# Patient Record
Sex: FEMALE | Race: WHITE | NOT HISPANIC OR LATINO | URBAN - METROPOLITAN AREA
[De-identification: names, ages, dates, MRNs, and addresses within clinical notes are randomized per-mention and may not be internally consistent; named-entity substitution may affect disease eponyms.]

---

## 2017-06-05 ENCOUNTER — EMERGENCY (EMERGENCY)
Facility: HOSPITAL | Age: 67
LOS: 0 days | Discharge: HOME | End: 2017-06-05
Admitting: INTERNAL MEDICINE

## 2017-06-12 ENCOUNTER — APPOINTMENT (OUTPATIENT)
Dept: CARDIOLOGY | Facility: CLINIC | Age: 67
End: 2017-06-12

## 2017-06-19 ENCOUNTER — APPOINTMENT (OUTPATIENT)
Dept: CARDIOLOGY | Facility: CLINIC | Age: 67
End: 2017-06-19

## 2017-06-19 VITALS — HEART RATE: 84 BPM | SYSTOLIC BLOOD PRESSURE: 120 MMHG | RESPIRATION RATE: 18 BRPM | DIASTOLIC BLOOD PRESSURE: 80 MMHG

## 2017-06-19 VITALS — WEIGHT: 193 LBS | HEIGHT: 67 IN | BODY MASS INDEX: 30.29 KG/M2

## 2017-06-28 DIAGNOSIS — K21.9 GASTRO-ESOPHAGEAL REFLUX DISEASE WITHOUT ESOPHAGITIS: ICD-10-CM

## 2017-06-28 DIAGNOSIS — I25.2 OLD MYOCARDIAL INFARCTION: ICD-10-CM

## 2017-06-28 DIAGNOSIS — R11.10 VOMITING, UNSPECIFIED: ICD-10-CM

## 2017-06-28 DIAGNOSIS — Z79.899 OTHER LONG TERM (CURRENT) DRUG THERAPY: ICD-10-CM

## 2017-06-28 DIAGNOSIS — R10.13 EPIGASTRIC PAIN: ICD-10-CM

## 2018-03-06 ENCOUNTER — INPATIENT (INPATIENT)
Facility: HOSPITAL | Age: 68
LOS: 0 days | Discharge: HOME | End: 2018-03-07
Attending: INTERNAL MEDICINE | Admitting: INTERNAL MEDICINE

## 2018-03-06 ENCOUNTER — EMERGENCY (EMERGENCY)
Facility: HOSPITAL | Age: 68
LOS: 0 days | Discharge: HOME | End: 2018-03-06
Admitting: INTERNAL MEDICINE

## 2018-03-06 VITALS
OXYGEN SATURATION: 99 % | HEART RATE: 76 BPM | DIASTOLIC BLOOD PRESSURE: 76 MMHG | HEIGHT: 67.75 IN | SYSTOLIC BLOOD PRESSURE: 136 MMHG | WEIGHT: 190.92 LBS | RESPIRATION RATE: 20 BRPM | TEMPERATURE: 98 F

## 2018-03-06 DIAGNOSIS — Z98.890 OTHER SPECIFIED POSTPROCEDURAL STATES: Chronic | ICD-10-CM

## 2018-03-06 DIAGNOSIS — I25.10 ATHEROSCLEROTIC HEART DISEASE OF NATIVE CORONARY ARTERY WITHOUT ANGINA PECTORIS: ICD-10-CM

## 2018-03-06 DIAGNOSIS — R07.9 CHEST PAIN, UNSPECIFIED: ICD-10-CM

## 2018-03-06 LAB
ALBUMIN SERPL ELPH-MCNC: 4.4 G/DL — SIGNIFICANT CHANGE UP (ref 3–5.5)
ALP SERPL-CCNC: 54 U/L — SIGNIFICANT CHANGE UP (ref 30–115)
ALT FLD-CCNC: 26 U/L — SIGNIFICANT CHANGE UP (ref 0–41)
ANION GAP SERPL CALC-SCNC: 7 MMOL/L — SIGNIFICANT CHANGE UP (ref 7–14)
AST SERPL-CCNC: 35 U/L — SIGNIFICANT CHANGE UP (ref 0–41)
BASOPHILS # BLD AUTO: 0.05 K/UL — SIGNIFICANT CHANGE UP (ref 0–0.2)
BASOPHILS NFR BLD AUTO: 0.8 % — SIGNIFICANT CHANGE UP (ref 0–1)
BILIRUB SERPL-MCNC: 1.1 MG/DL — SIGNIFICANT CHANGE UP (ref 0.2–1.2)
BUN SERPL-MCNC: 18 MG/DL — SIGNIFICANT CHANGE UP (ref 10–20)
CALCIUM SERPL-MCNC: 9.7 MG/DL — SIGNIFICANT CHANGE UP (ref 8.5–10.1)
CHLORIDE SERPL-SCNC: 105 MMOL/L — SIGNIFICANT CHANGE UP (ref 98–110)
CK MB BLD-MCNC: 1 % — SIGNIFICANT CHANGE UP (ref 0–4)
CK MB BLD-MCNC: 2 % — SIGNIFICANT CHANGE UP (ref 0–4)
CK MB CFR SERPL CALC: 2.1 NG/ML — SIGNIFICANT CHANGE UP (ref 0.6–6.3)
CK MB CFR SERPL CALC: 3.4 NG/ML — SIGNIFICANT CHANGE UP (ref 0.6–6.3)
CK SERPL-CCNC: 148 U/L — SIGNIFICANT CHANGE UP (ref 0–225)
CK SERPL-CCNC: 212 U/L — SIGNIFICANT CHANGE UP (ref 0–225)
CO2 SERPL-SCNC: 25 MMOL/L — SIGNIFICANT CHANGE UP (ref 17–32)
CREAT SERPL-MCNC: 0.8 MG/DL — SIGNIFICANT CHANGE UP (ref 0.7–1.5)
EOSINOPHIL # BLD AUTO: 0.1 K/UL — SIGNIFICANT CHANGE UP (ref 0–0.7)
EOSINOPHIL NFR BLD AUTO: 1.6 % — SIGNIFICANT CHANGE UP (ref 0–8)
GLUCOSE SERPL-MCNC: 88 MG/DL — SIGNIFICANT CHANGE UP (ref 70–110)
HCT VFR BLD CALC: 40.5 % — SIGNIFICANT CHANGE UP (ref 37–47)
HGB BLD-MCNC: 13.8 G/DL — SIGNIFICANT CHANGE UP (ref 12–16)
IMM GRANULOCYTES NFR BLD AUTO: 0.2 % — SIGNIFICANT CHANGE UP (ref 0.1–0.3)
LIDOCAIN IGE QN: 25 U/L — SIGNIFICANT CHANGE UP (ref 7–60)
LYMPHOCYTES # BLD AUTO: 1.88 K/UL — SIGNIFICANT CHANGE UP (ref 1.2–3.4)
LYMPHOCYTES # BLD AUTO: 30.8 % — SIGNIFICANT CHANGE UP (ref 20.5–51.1)
MCHC RBC-ENTMCNC: 31 PG — SIGNIFICANT CHANGE UP (ref 27–31)
MCHC RBC-ENTMCNC: 34.1 G/DL — SIGNIFICANT CHANGE UP (ref 32–37)
MCV RBC AUTO: 91 FL — SIGNIFICANT CHANGE UP (ref 81–99)
MONOCYTES # BLD AUTO: 0.49 K/UL — SIGNIFICANT CHANGE UP (ref 0.1–0.6)
MONOCYTES NFR BLD AUTO: 8 % — SIGNIFICANT CHANGE UP (ref 1.7–9.3)
NEUTROPHILS # BLD AUTO: 3.57 K/UL — SIGNIFICANT CHANGE UP (ref 1.4–6.5)
NEUTROPHILS NFR BLD AUTO: 58.6 % — SIGNIFICANT CHANGE UP (ref 42.2–75.2)
NRBC # BLD: 0 /100 WBCS — SIGNIFICANT CHANGE UP (ref 0–0)
PLATELET # BLD AUTO: 196 K/UL — SIGNIFICANT CHANGE UP (ref 130–400)
POTASSIUM SERPL-MCNC: 4 MMOL/L — SIGNIFICANT CHANGE UP (ref 3.5–5)
POTASSIUM SERPL-SCNC: 4 MMOL/L — SIGNIFICANT CHANGE UP (ref 3.5–5)
PROT SERPL-MCNC: 6.8 G/DL — SIGNIFICANT CHANGE UP (ref 6–8)
RBC # BLD: 4.45 M/UL — SIGNIFICANT CHANGE UP (ref 4.2–5.4)
RBC # FLD: 12.6 % — SIGNIFICANT CHANGE UP (ref 11.5–14.5)
SODIUM SERPL-SCNC: 137 MMOL/L — SIGNIFICANT CHANGE UP (ref 135–146)
TROPONIN I SERPL-MCNC: <0.02 NG/ML — SIGNIFICANT CHANGE UP (ref 0–0.05)
TROPONIN I SERPL-MCNC: <0.02 NG/ML — SIGNIFICANT CHANGE UP (ref 0–0.05)
WBC # BLD: 6.1 K/UL — SIGNIFICANT CHANGE UP (ref 4.8–10.8)
WBC # FLD AUTO: 6.1 K/UL — SIGNIFICANT CHANGE UP (ref 4.8–10.8)

## 2018-03-06 RX ORDER — SODIUM CHLORIDE 9 MG/ML
3 INJECTION INTRAMUSCULAR; INTRAVENOUS; SUBCUTANEOUS ONCE
Qty: 0 | Refills: 0 | Status: COMPLETED | OUTPATIENT
Start: 2018-03-06 | End: 2018-03-06

## 2018-03-06 RX ORDER — ASPIRIN/CALCIUM CARB/MAGNESIUM 324 MG
81 TABLET ORAL DAILY
Qty: 0 | Refills: 0 | Status: DISCONTINUED | OUTPATIENT
Start: 2018-03-06 | End: 2018-03-07

## 2018-03-06 RX ORDER — ACETAMINOPHEN 500 MG
650 TABLET ORAL EVERY 6 HOURS
Qty: 0 | Refills: 0 | Status: DISCONTINUED | OUTPATIENT
Start: 2018-03-06 | End: 2018-03-07

## 2018-03-06 RX ORDER — ENOXAPARIN SODIUM 100 MG/ML
40 INJECTION SUBCUTANEOUS EVERY 24 HOURS
Qty: 0 | Refills: 0 | Status: DISCONTINUED | OUTPATIENT
Start: 2018-03-06 | End: 2018-03-07

## 2018-03-06 RX ADMIN — SODIUM CHLORIDE 3 MILLILITER(S): 9 INJECTION INTRAMUSCULAR; INTRAVENOUS; SUBCUTANEOUS at 11:43

## 2018-03-06 NOTE — ED PROVIDER NOTE - CARE PLAN
Principal Discharge DX:	Chest pain Principal Discharge DX:	Chest pain  Secondary Diagnosis:	Coronary artery disease

## 2018-03-06 NOTE — H&P ADULT - PSH
H/O lumpectomy    History of bowel resection    History of cholecystectomy    History of tonsillectomy

## 2018-03-06 NOTE — H&P ADULT - PROBLEM SELECTOR PLAN 1
-Admit to tele  -trend CE x 2 more sets (2200 & 0700)  -Asa 81mg PO QD  -check lipids in AM to initiate statin therapy  -Cardiology c/s

## 2018-03-06 NOTE — H&P ADULT - ASSESSMENT
67F with Hx of CAD presents to ED with complaints of chest heaviness, initial workup negative, subsequently admitted to telemetry to R/O ACS

## 2018-03-06 NOTE — ED PROVIDER NOTE - ATTENDING CONTRIBUTION TO CARE
Pt with a significant hx of cad and recent cp.  She was admitted for further evaluation secondary to high likelihood to worsen abruptly.

## 2018-03-06 NOTE — H&P ADULT - HISTORY OF PRESENT ILLNESS
67F presented to the ED with complaints of chest heaviness developing this morning while out walking.  patient states she was freightened by a dog and jumped, and immediately developed substernal chest "heaviness"  Patient went home to relax and heaviness persisted.  States she developed paresthesias in her b/l hands along with some mild diaphoresis and nausea without vomiting.  Patient came to the ED as she had Hx of CAd s/p MI in 2008 to be evaluated.  At interview patient resting comfortably.  States still with some chest heaviness however much improved from initial pain.

## 2018-03-06 NOTE — H&P ADULT - NSHPLABSRESULTS_GEN_ALL_CORE
13.8   6.10  )-----------( 196      ( 06 Mar 2018 11:09 )             40.5       03-06    137  |  105  |  18  ----------------------------<  88  4.0   |  25  |  0.8    Ca    9.7      06 Mar 2018 11:09    TPro  6.8  /  Alb  4.4  /  TBili  1.1  /  DBili  x   /  AST  35  /  ALT  26  /  AlkPhos  54  03-06              CARDIAC MARKERS ( 06 Mar 2018 11:09 )  <0.02 ng/mL / x     / 212 U/L / x     / 3.4 ng/mL

## 2018-03-06 NOTE — H&P ADULT - NSHPSOCIALHISTORY_GEN_ALL_CORE
Tobacco use: Denies   EtOH use: Denies   Illicit drug use: Denies   Marital Status: Patient is  and lives at home by herself

## 2018-03-06 NOTE — ED ADULT TRIAGE NOTE - CHIEF COMPLAINT QUOTE
"I was out walking and was frightened by a dog. Shortly thereafter I developed a sharp pain to the middle of my chest. My hands felt funny and my feet felt funny." Pt states sharp pain has resided but she has a heaviness to the chest. Pt has a history of MI in 2008.

## 2018-03-07 ENCOUNTER — TRANSCRIPTION ENCOUNTER (OUTPATIENT)
Age: 68
End: 2018-03-07

## 2018-03-07 VITALS
SYSTOLIC BLOOD PRESSURE: 145 MMHG | HEART RATE: 64 BPM | DIASTOLIC BLOOD PRESSURE: 67 MMHG | RESPIRATION RATE: 16 BRPM | TEMPERATURE: 97 F

## 2018-03-07 RX ORDER — ASPIRIN/CALCIUM CARB/MAGNESIUM 324 MG
1 TABLET ORAL
Qty: 0 | Refills: 0 | DISCHARGE
Start: 2018-03-07

## 2018-03-07 RX ADMIN — ENOXAPARIN SODIUM 40 MILLIGRAM(S): 100 INJECTION SUBCUTANEOUS at 05:24

## 2018-03-07 NOTE — DISCHARGE NOTE ADULT - PATIENT PORTAL LINK FT
You can access the [email or web address] Patient Portal, offered by [facility], by registering with the following website: [email or web address]

## 2018-03-07 NOTE — DISCHARGE NOTE ADULT - MEDICATION SUMMARY - MEDICATIONS TO TAKE
yes
I will START or STAY ON the medications listed below when I get home from the hospital:    aspirin 81 mg oral tablet, chewable  -- 1 tab(s) by mouth once a day  -- Indication: For Coronary artery disease

## 2018-03-07 NOTE — DISCHARGE NOTE ADULT - CARE PLAN
Principal Discharge DX:	Chest pain  Goal:	Resolution  Assessment and plan of treatment:	Continue with low dose ASA.  FU with PMD for evaluation to start statin.  FU outpatient with Cardiology

## 2018-03-07 NOTE — DISCHARGE NOTE ADULT - PLAN OF CARE
Resolution Continue with low dose ASA.  FU with PMD for evaluation to start statin.  FU outpatient with Cardiology

## 2018-03-12 PROBLEM — I21.9 ACUTE MYOCARDIAL INFARCTION, UNSPECIFIED: Chronic | Status: ACTIVE | Noted: 2018-03-06

## 2018-03-12 PROBLEM — C44.90 UNSPECIFIED MALIGNANT NEOPLASM OF SKIN, UNSPECIFIED: Chronic | Status: ACTIVE | Noted: 2018-03-06

## 2018-03-20 DIAGNOSIS — R20.2 PARESTHESIA OF SKIN: ICD-10-CM

## 2018-03-20 DIAGNOSIS — Z85.828 PERSONAL HISTORY OF OTHER MALIGNANT NEOPLASM OF SKIN: ICD-10-CM

## 2018-03-20 DIAGNOSIS — R07.89 OTHER CHEST PAIN: ICD-10-CM

## 2018-03-20 DIAGNOSIS — Z98.890 OTHER SPECIFIED POSTPROCEDURAL STATES: ICD-10-CM

## 2018-03-20 DIAGNOSIS — I25.10 ATHEROSCLEROTIC HEART DISEASE OF NATIVE CORONARY ARTERY WITHOUT ANGINA PECTORIS: ICD-10-CM

## 2018-03-20 DIAGNOSIS — Z90.49 ACQUIRED ABSENCE OF OTHER SPECIFIED PARTS OF DIGESTIVE TRACT: ICD-10-CM

## 2018-03-20 DIAGNOSIS — I25.2 OLD MYOCARDIAL INFARCTION: ICD-10-CM

## 2018-03-20 DIAGNOSIS — Z85.3 PERSONAL HISTORY OF MALIGNANT NEOPLASM OF BREAST: ICD-10-CM

## 2018-06-04 ENCOUNTER — APPOINTMENT (OUTPATIENT)
Dept: CARDIOLOGY | Facility: CLINIC | Age: 68
End: 2018-06-04

## 2018-06-11 ENCOUNTER — APPOINTMENT (OUTPATIENT)
Dept: CARDIOLOGY | Facility: CLINIC | Age: 68
End: 2018-06-11

## 2018-06-11 VITALS — WEIGHT: 190 LBS | HEIGHT: 67 IN | BODY MASS INDEX: 29.82 KG/M2

## 2018-06-11 VITALS — SYSTOLIC BLOOD PRESSURE: 120 MMHG | HEART RATE: 76 BPM | RESPIRATION RATE: 18 BRPM | DIASTOLIC BLOOD PRESSURE: 80 MMHG

## 2018-07-02 ENCOUNTER — APPOINTMENT (OUTPATIENT)
Dept: CARDIOLOGY | Facility: CLINIC | Age: 68
End: 2018-07-02

## 2019-05-06 ENCOUNTER — TRANSCRIPTION ENCOUNTER (OUTPATIENT)
Age: 69
End: 2019-05-06

## 2019-05-20 ENCOUNTER — TRANSCRIPTION ENCOUNTER (OUTPATIENT)
Age: 69
End: 2019-05-20

## 2019-05-21 ENCOUNTER — INPATIENT (INPATIENT)
Facility: HOSPITAL | Age: 69
LOS: 1 days | Discharge: HOME | End: 2019-05-23
Attending: INTERNAL MEDICINE | Admitting: INTERNAL MEDICINE
Payer: MEDICARE

## 2019-05-21 VITALS
TEMPERATURE: 96 F | RESPIRATION RATE: 18 BRPM | HEART RATE: 84 BPM | OXYGEN SATURATION: 97 % | DIASTOLIC BLOOD PRESSURE: 69 MMHG | SYSTOLIC BLOOD PRESSURE: 138 MMHG

## 2019-05-21 DIAGNOSIS — Z98.890 OTHER SPECIFIED POSTPROCEDURAL STATES: Chronic | ICD-10-CM

## 2019-05-21 PROBLEM — I25.10 ATHEROSCLEROTIC HEART DISEASE OF NATIVE CORONARY ARTERY WITHOUT ANGINA PECTORIS: Chronic | Status: ACTIVE | Noted: 2018-03-06

## 2019-05-21 LAB
ALBUMIN SERPL ELPH-MCNC: 4.7 G/DL — SIGNIFICANT CHANGE UP (ref 3.5–5.2)
ALP SERPL-CCNC: 97 U/L — SIGNIFICANT CHANGE UP (ref 30–115)
ALT FLD-CCNC: 22 U/L — SIGNIFICANT CHANGE UP (ref 0–41)
ANION GAP SERPL CALC-SCNC: 15 MMOL/L — HIGH (ref 7–14)
AST SERPL-CCNC: 29 U/L — SIGNIFICANT CHANGE UP (ref 0–41)
BASOPHILS # BLD AUTO: 0.03 K/UL — SIGNIFICANT CHANGE UP (ref 0–0.2)
BASOPHILS NFR BLD AUTO: 0.5 % — SIGNIFICANT CHANGE UP (ref 0–1)
BILIRUB SERPL-MCNC: 0.7 MG/DL — SIGNIFICANT CHANGE UP (ref 0.2–1.2)
BUN SERPL-MCNC: 19 MG/DL — SIGNIFICANT CHANGE UP (ref 10–20)
CALCIUM SERPL-MCNC: 10 MG/DL — SIGNIFICANT CHANGE UP (ref 8.5–10.1)
CHLORIDE SERPL-SCNC: 103 MMOL/L — SIGNIFICANT CHANGE UP (ref 98–110)
CO2 SERPL-SCNC: 21 MMOL/L — SIGNIFICANT CHANGE UP (ref 17–32)
CREAT SERPL-MCNC: 0.8 MG/DL — SIGNIFICANT CHANGE UP (ref 0.7–1.5)
EOSINOPHIL # BLD AUTO: 0.18 K/UL — SIGNIFICANT CHANGE UP (ref 0–0.7)
EOSINOPHIL NFR BLD AUTO: 2.8 % — SIGNIFICANT CHANGE UP (ref 0–8)
GLUCOSE SERPL-MCNC: 99 MG/DL — SIGNIFICANT CHANGE UP (ref 70–99)
HCT VFR BLD CALC: 44.6 % — SIGNIFICANT CHANGE UP (ref 37–47)
HGB BLD-MCNC: 15.2 G/DL — SIGNIFICANT CHANGE UP (ref 12–16)
IMM GRANULOCYTES NFR BLD AUTO: 0.3 % — SIGNIFICANT CHANGE UP (ref 0.1–0.3)
LACTATE SERPL-SCNC: 0.9 MMOL/L — SIGNIFICANT CHANGE UP (ref 0.5–2.2)
LIDOCAIN IGE QN: 38 U/L — SIGNIFICANT CHANGE UP (ref 7–60)
LYMPHOCYTES # BLD AUTO: 1.86 K/UL — SIGNIFICANT CHANGE UP (ref 1.2–3.4)
LYMPHOCYTES # BLD AUTO: 28.5 % — SIGNIFICANT CHANGE UP (ref 20.5–51.1)
MAGNESIUM SERPL-MCNC: 2 MG/DL — SIGNIFICANT CHANGE UP (ref 1.8–2.4)
MCHC RBC-ENTMCNC: 31.3 PG — HIGH (ref 27–31)
MCHC RBC-ENTMCNC: 34.1 G/DL — SIGNIFICANT CHANGE UP (ref 32–37)
MCV RBC AUTO: 91.8 FL — SIGNIFICANT CHANGE UP (ref 81–99)
MONOCYTES # BLD AUTO: 0.58 K/UL — SIGNIFICANT CHANGE UP (ref 0.1–0.6)
MONOCYTES NFR BLD AUTO: 8.9 % — SIGNIFICANT CHANGE UP (ref 1.7–9.3)
NEUTROPHILS # BLD AUTO: 3.85 K/UL — SIGNIFICANT CHANGE UP (ref 1.4–6.5)
NEUTROPHILS NFR BLD AUTO: 59 % — SIGNIFICANT CHANGE UP (ref 42.2–75.2)
NRBC # BLD: 0 /100 WBCS — SIGNIFICANT CHANGE UP (ref 0–0)
PLATELET # BLD AUTO: 207 K/UL — SIGNIFICANT CHANGE UP (ref 130–400)
POTASSIUM SERPL-MCNC: 4.5 MMOL/L — SIGNIFICANT CHANGE UP (ref 3.5–5)
POTASSIUM SERPL-SCNC: 4.5 MMOL/L — SIGNIFICANT CHANGE UP (ref 3.5–5)
PROT SERPL-MCNC: 7.3 G/DL — SIGNIFICANT CHANGE UP (ref 6–8)
RBC # BLD: 4.86 M/UL — SIGNIFICANT CHANGE UP (ref 4.2–5.4)
RBC # FLD: 12.6 % — SIGNIFICANT CHANGE UP (ref 11.5–14.5)
SODIUM SERPL-SCNC: 139 MMOL/L — SIGNIFICANT CHANGE UP (ref 135–146)
TROPONIN T SERPL-MCNC: 0.05 NG/ML — CRITICAL HIGH
WBC # BLD: 6.52 K/UL — SIGNIFICANT CHANGE UP (ref 4.8–10.8)
WBC # FLD AUTO: 6.52 K/UL — SIGNIFICANT CHANGE UP (ref 4.8–10.8)

## 2019-05-21 PROCEDURE — 93010 ELECTROCARDIOGRAM REPORT: CPT

## 2019-05-21 PROCEDURE — 99285 EMERGENCY DEPT VISIT HI MDM: CPT

## 2019-05-21 PROCEDURE — 71046 X-RAY EXAM CHEST 2 VIEWS: CPT | Mod: 26

## 2019-05-21 RX ORDER — PANTOPRAZOLE SODIUM 20 MG/1
40 TABLET, DELAYED RELEASE ORAL
Refills: 0 | Status: DISCONTINUED | OUTPATIENT
Start: 2019-05-21 | End: 2019-05-23

## 2019-05-21 RX ORDER — ASPIRIN/CALCIUM CARB/MAGNESIUM 324 MG
325 TABLET ORAL ONCE
Refills: 0 | Status: COMPLETED | OUTPATIENT
Start: 2019-05-21 | End: 2019-05-21

## 2019-05-21 RX ORDER — SUCRALFATE 1 G
1 TABLET ORAL EVERY 6 HOURS
Refills: 0 | Status: DISCONTINUED | OUTPATIENT
Start: 2019-05-21 | End: 2019-05-23

## 2019-05-21 RX ORDER — CHLORHEXIDINE GLUCONATE 213 G/1000ML
1 SOLUTION TOPICAL
Refills: 0 | Status: DISCONTINUED | OUTPATIENT
Start: 2019-05-21 | End: 2019-05-23

## 2019-05-21 RX ORDER — ASPIRIN/CALCIUM CARB/MAGNESIUM 324 MG
81 TABLET ORAL DAILY
Refills: 0 | Status: DISCONTINUED | OUTPATIENT
Start: 2019-05-22 | End: 2019-05-23

## 2019-05-21 RX ORDER — SIMVASTATIN 20 MG/1
10 TABLET, FILM COATED ORAL AT BEDTIME
Refills: 0 | Status: DISCONTINUED | OUTPATIENT
Start: 2019-05-21 | End: 2019-05-23

## 2019-05-21 RX ADMIN — Medication 325 MILLIGRAM(S): at 20:11

## 2019-05-21 NOTE — ED PROVIDER NOTE - CLINICAL SUMMARY MEDICAL DECISION MAKING FREE TEXT BOX
pt w CP, hx MI, new ischemic EKG changes and elevated troponin, ASA given, admit to tele, needs cardiology eval, stress testing.

## 2019-05-21 NOTE — H&P ADULT - NSHPPHYSICALEXAM_GEN_ALL_CORE
Vital Signs Last 24 Hrs  T(C): 36.4 (21 May 2019 19:16), Max: 36.4 (21 May 2019 19:16)  T(F): 97.5 (21 May 2019 19:16), Max: 97.5 (21 May 2019 19:16)  HR: 70 (21 May 2019 19:16) (70 - 84)  BP: 141/71 (21 May 2019 19:16) (138/69 - 141/71)  BP(mean): --  RR: 18 (21 May 2019 19:16) (18 - 18)  SpO2: 98% (21 May 2019 19:16) (97% - 98%)    Physical Exam:    -     General :     -      HEENT:    -      Cardiac:    -      Pulm:    -      GI:    -      Musculoskeletal:    -      Neuro: Vital Signs Last 24 Hrs  T(C): 36.4 (21 May 2019 19:16), Max: 36.4 (21 May 2019 19:16)  T(F): 97.5 (21 May 2019 19:16), Max: 97.5 (21 May 2019 19:16)  HR: 70 (21 May 2019 19:16) (70 - 84)  BP: 141/71 (21 May 2019 19:16) (138/69 - 141/71)  BP(mean): --  RR: 18 (21 May 2019 19:16) (18 - 18)  SpO2: 98% (21 May 2019 19:16) (97% - 98%)    Physical Exam:    -     General : alert, awake and in no acute distress    -      HEENT: normocephalic    -      Cardiac: RRR, normal S1/S2    -      Pulm: CTA B/L    -      GI: abdomen soft, non-tender    -      Musculoskeletal: no lower extremity edema    -      Neuro: AAO x3

## 2019-05-21 NOTE — ED PROVIDER NOTE - NS ED ROS FT
Eyes:  No visual changes, eye pain or discharge.  ENMT:  No hearing changes, pain, discharge or infections. No neck pain or stiffness.  Cardiac: Chest heaviness, LH. No leg swelling, palpitations. No cp with exertion.  Respiratory:  No cough or respiratory distress.   GI:  Epigastric pain. No nausea, vomiting, diarrhea  :  No dysuria, frequency or burning.  MS:  No myalgia, muscle weakness, joint pain or back pain.  Neuro:  No headache or weakness.  No LOC.  Skin:  No skin rash.   Endocrine: No history of thyroid disease or diabetes.

## 2019-05-21 NOTE — H&P ADULT - NSHPLABSRESULTS_GEN_ALL_CORE
Labs:                        15.2   6.52  )-----------( 207      ( 21 May 2019 17:30 )             44.6             05-21    139  |  103  |  19  ----------------------------<  99  4.5   |  21  |  0.8    Ca    10.0      21 May 2019 17:30  Mg     2.0     05-21    TPro  7.3  /  Alb  4.7  /  TBili  0.7  /  DBili  x   /  AST  29  /  ALT  22  /  AlkPhos  97  05-21    LIVER FUNCTIONS - ( 21 May 2019 17:30 )  Alb: 4.7 g/dL / Pro: 7.3 g/dL / ALK PHOS: 97 U/L / ALT: 22 U/L / AST: 29 U/L / GGT: x                   CARDIAC MARKERS ( 21 May 2019 17:30 )  x     / 0.05 ng/mL / x     / x     / x

## 2019-05-21 NOTE — H&P ADULT - ASSESSMENT
The patient is a 69-year-old female with a PMH of CAD, PUD, hiatal hernia, breast cancer s/p lumpectomy, rectal adenoma s/p resection, diverticulosis, and "skin cancer" who presented with a 2-day history of epigastric pain.    1. Epigastric pain  - admit to telemetry  - DDx includes PUD vs. unstable angina vs. musculoskeletal etiology  - [place] score is 3  - troponin: 0.05  - EKG: NSR with Q-waves in inferior leads, T-wave inversions in leads III, aVF, and V3-V6  - previous EKGs on record revealed similar Q-waves in inferior leads  - as per outpatient cardiology note from June 2018, patient previously had negative work-up for MI and was diagnosed with stress cardiomyopathy  - cardiology consult pending  - check repeat troponin and EKG  - start ASA and statin    2. History of PUD  - continue Carafate and PPI    3. DVT prophylaxis  - bilateral SCDs    4. Disposition  - anticipate discharge home once medically stable

## 2019-05-21 NOTE — ED PROVIDER NOTE - ATTENDING CONTRIBUTION TO CARE
69F PMH CAD s/p MI 2012 no stent, on no meds, s/p cholecystectomy, breast ca in remission, PUD, colonic polyp w partial colectomy, p/w CP. started over the weekend while walking outside, states she got sweaty, dizzy (lighthead) and chest heaviness assoc w SOB. resolved on its own w rest after few mins but ever since then has had random intermittent CP she describes as "leeroy horse in my lungs" that starts in epigastrium and wraps around both lungs, at rest or with minor exertion. pt is asymptomatic at this time.     no fever, cough. no trauma. no tearing bp. no le edema, le pain, immobilization, hormones, hemotpysis. cardiologist is Dr Smith, last stress 1 yr ago. nonsmoker.     on exam, AFVSS, well biju nad, ncat, eomi, perrla, mmm, lctab, rrr nl s1s2 no mrg, abd soft ntnd, aaox3, no focal deficits, no le edema or calf ttp,     a/p; CP, EKG w new TWI AVF, V4-6 as compared to last admission for r/o ACS. will send troponin, labs, CXR, ASA, admit for cardiology eval to tele high risk ACS concerning symptoms and EKG changes.

## 2019-05-21 NOTE — ED PROVIDER NOTE - PHYSICAL EXAMINATION
CONSTITUTIONAL: Well-developed; well-nourished; in no acute distress.   SKIN: warm, dry  HEAD: Normocephalic; atraumatic.  EYES: PERRL, EOMI, normal sclera and conjunctiva   ENT: No nasal discharge; airway clear.  NECK: Supple; non tender.  CARD: S1, S2 normal; no murmurs, gallops, or rubs. Regular rate and rhythm. Distal radial pulses intact and equal bilaterally. Pain not reproducible to palpation of chest wall.   RESP: No wheezes, rales or rhonchi.  ABD: soft ntnd, no cva tenderness.   EXT: Normal ROM.  No clubbing, cyanosis or edema.   LYMPH: No acute cervical adenopathy.  NEURO: Alert, oriented, grossly unremarkable. No cranial nerve deficits, moving all extremities well.  PSYCH: Cooperative, appropriate.

## 2019-05-21 NOTE — ED PROVIDER NOTE - CARE PLAN
Principal Discharge DX:	Chest pain  Secondary Diagnosis:	Elevated troponin  Secondary Diagnosis:	EKG abnormalities

## 2019-05-21 NOTE — H&P ADULT - ATTENDING COMMENTS
pt seen and examined independently, I have read and agree with above exam and poa,    chest pain  h/o cad  pud    cardio apprec  tele  nuclear stress  2decho  ppi

## 2019-05-21 NOTE — H&P ADULT - HISTORY OF PRESENT ILLNESS
The patient is a 69-year-old female with a PMH of CAD, PUD, breast cancer s/p lumpectomy, rectal adenoma s/p resection, diverticulosis, and "skin cancer" who presented with a 2-day history of epigastric pain.  She reports developing pain in her infra-mammary area described as a pressure-like sensation.  The pain was intermittent and lasts about 30 seconds at a time before resolving on its own.  It can occur at any time without relation to exertion or food intake.  She also experienced a sensation of breathlessness and back pain during these events.  She has never experienced similar symptoms in the past.  Otherwise, she reported experiencing stomach discomfort a day prior to onset of her current symptoms.  She denied having fever, chills, vomiting, or diarrhea. The patient is a 69-year-old female with a PMH of CAD, PUD, hiatal hernia, breast cancer s/p lumpectomy, rectal adenoma s/p resection, diverticulosis, and "skin cancer" who presented with a 2-day history of epigastric pain.  She reports developing pain in her infra-mammary area described as a pressure-like sensation.  The pain was intermittent and lasts about 30 seconds at a time before resolving on its own.  It can occur at any time without relation to exertion or food intake.  She also experienced a sensation of breathlessness and back pain during these events.  She has never experienced similar symptoms in the past.  Otherwise, she reported experiencing stomach discomfort a day prior to onset of her current symptoms.  She denied having fever, chills, vomiting, or diarrhea.

## 2019-05-21 NOTE — H&P ADULT - NSICDXPASTSURGICALHX_GEN_ALL_CORE_FT
PAST SURGICAL HISTORY:  H/O lumpectomy     History of bowel resection     History of cholecystectomy     History of tonsillectomy

## 2019-05-21 NOTE — ED PROVIDER NOTE - OBJECTIVE STATEMENT
69 y f pmh cad, peptic ulcer, s/p cholecystectomy pw chest pain. Midsternal chest heaviness with lightheadedness and dizziness for 2 days. Associated with epigastric pain. Intermittent, lasts a few minutes at time. No alleviating or exacerbating factors. No inciting incidents. Currently asymptomatic. Denies ha, n/v, back pain, arm pain, weakness, diarrhea, palpitations, syncope.

## 2019-05-22 ENCOUNTER — TRANSCRIPTION ENCOUNTER (OUTPATIENT)
Age: 69
End: 2019-05-22

## 2019-05-22 LAB
ALBUMIN SERPL ELPH-MCNC: 3.9 G/DL — SIGNIFICANT CHANGE UP (ref 3.5–5.2)
ALP SERPL-CCNC: 81 U/L — SIGNIFICANT CHANGE UP (ref 30–115)
ALT FLD-CCNC: 18 U/L — SIGNIFICANT CHANGE UP (ref 0–41)
ANION GAP SERPL CALC-SCNC: 12 MMOL/L — SIGNIFICANT CHANGE UP (ref 7–14)
AST SERPL-CCNC: 23 U/L — SIGNIFICANT CHANGE UP (ref 0–41)
BILIRUB SERPL-MCNC: 0.9 MG/DL — SIGNIFICANT CHANGE UP (ref 0.2–1.2)
BUN SERPL-MCNC: 15 MG/DL — SIGNIFICANT CHANGE UP (ref 10–20)
CALCIUM SERPL-MCNC: 9.6 MG/DL — SIGNIFICANT CHANGE UP (ref 8.5–10.1)
CHLORIDE SERPL-SCNC: 107 MMOL/L — SIGNIFICANT CHANGE UP (ref 98–110)
CHOLEST SERPL-MCNC: 191 MG/DL — SIGNIFICANT CHANGE UP (ref 100–200)
CK MB CFR SERPL CALC: 3.6 NG/ML — SIGNIFICANT CHANGE UP (ref 0.6–6.3)
CK SERPL-CCNC: 94 U/L — SIGNIFICANT CHANGE UP (ref 0–225)
CO2 SERPL-SCNC: 23 MMOL/L — SIGNIFICANT CHANGE UP (ref 17–32)
CREAT SERPL-MCNC: 0.8 MG/DL — SIGNIFICANT CHANGE UP (ref 0.7–1.5)
ESTIMATED AVERAGE GLUCOSE: 111 MG/DL — SIGNIFICANT CHANGE UP (ref 68–114)
GLUCOSE SERPL-MCNC: 77 MG/DL — SIGNIFICANT CHANGE UP (ref 70–99)
HBA1C BLD-MCNC: 5.5 % — SIGNIFICANT CHANGE UP (ref 4–5.6)
HCT VFR BLD CALC: 42.8 % — SIGNIFICANT CHANGE UP (ref 37–47)
HDLC SERPL-MCNC: 57 MG/DL — SIGNIFICANT CHANGE UP
HGB BLD-MCNC: 14.4 G/DL — SIGNIFICANT CHANGE UP (ref 12–16)
LIPID PNL WITH DIRECT LDL SERPL: 128 MG/DL — SIGNIFICANT CHANGE UP (ref 4–129)
MAGNESIUM SERPL-MCNC: 2 MG/DL — SIGNIFICANT CHANGE UP (ref 1.8–2.4)
MCHC RBC-ENTMCNC: 31.2 PG — HIGH (ref 27–31)
MCHC RBC-ENTMCNC: 33.6 G/DL — SIGNIFICANT CHANGE UP (ref 32–37)
MCV RBC AUTO: 92.8 FL — SIGNIFICANT CHANGE UP (ref 81–99)
NRBC # BLD: 0 /100 WBCS — SIGNIFICANT CHANGE UP (ref 0–0)
PLATELET # BLD AUTO: 161 K/UL — SIGNIFICANT CHANGE UP (ref 130–400)
POTASSIUM SERPL-MCNC: 4.4 MMOL/L — SIGNIFICANT CHANGE UP (ref 3.5–5)
POTASSIUM SERPL-SCNC: 4.4 MMOL/L — SIGNIFICANT CHANGE UP (ref 3.5–5)
PROT SERPL-MCNC: 6.2 G/DL — SIGNIFICANT CHANGE UP (ref 6–8)
RBC # BLD: 4.61 M/UL — SIGNIFICANT CHANGE UP (ref 4.2–5.4)
RBC # FLD: 12.5 % — SIGNIFICANT CHANGE UP (ref 11.5–14.5)
SODIUM SERPL-SCNC: 142 MMOL/L — SIGNIFICANT CHANGE UP (ref 135–146)
TOTAL CHOLESTEROL/HDL RATIO MEASUREMENT: 3.4 RATIO — LOW (ref 4–5.5)
TRIGL SERPL-MCNC: 63 MG/DL — SIGNIFICANT CHANGE UP (ref 10–149)
TROPONIN T SERPL-MCNC: 0.04 NG/ML — CRITICAL HIGH
TROPONIN T SERPL-MCNC: 0.04 NG/ML — CRITICAL HIGH
WBC # BLD: 5.1 K/UL — SIGNIFICANT CHANGE UP (ref 4.8–10.8)
WBC # FLD AUTO: 5.1 K/UL — SIGNIFICANT CHANGE UP (ref 4.8–10.8)

## 2019-05-22 PROCEDURE — 93306 TTE W/DOPPLER COMPLETE: CPT | Mod: 26

## 2019-05-22 PROCEDURE — 93016 CV STRESS TEST SUPVJ ONLY: CPT

## 2019-05-22 PROCEDURE — 78452 HT MUSCLE IMAGE SPECT MULT: CPT | Mod: 26

## 2019-05-22 PROCEDURE — 93018 CV STRESS TEST I&R ONLY: CPT

## 2019-05-22 PROCEDURE — 93010 ELECTROCARDIOGRAM REPORT: CPT | Mod: 59

## 2019-05-22 PROCEDURE — 99223 1ST HOSP IP/OBS HIGH 75: CPT

## 2019-05-22 RX ORDER — ASPIRIN/CALCIUM CARB/MAGNESIUM 324 MG
1 TABLET ORAL
Qty: 0 | Refills: 0 | DISCHARGE
Start: 2019-05-22

## 2019-05-22 RX ORDER — ADENOSINE 3 MG/ML
60 INJECTION INTRAVENOUS ONCE
Refills: 0 | Status: COMPLETED | OUTPATIENT
Start: 2019-05-22 | End: 2019-05-22

## 2019-05-22 RX ORDER — SIMVASTATIN 20 MG/1
1 TABLET, FILM COATED ORAL
Qty: 0 | Refills: 0 | DISCHARGE
Start: 2019-05-22

## 2019-05-22 RX ADMIN — SIMVASTATIN 10 MILLIGRAM(S): 20 TABLET, FILM COATED ORAL at 22:09

## 2019-05-22 RX ADMIN — Medication 81 MILLIGRAM(S): at 12:25

## 2019-05-22 RX ADMIN — Medication 1 GRAM(S): at 00:39

## 2019-05-22 RX ADMIN — Medication 1 GRAM(S): at 05:19

## 2019-05-22 RX ADMIN — Medication 1 GRAM(S): at 17:01

## 2019-05-22 RX ADMIN — ADENOSINE 600 MILLIGRAM(S): 3 INJECTION INTRAVENOUS at 14:10

## 2019-05-22 NOTE — PROGRESS NOTE ADULT - SUBJECTIVE AND OBJECTIVE BOX
GERARDO COCHRAN 69y Female  MRN#: [id number]         SUBJECTIVE  Patient is a 69y old Female who presents with a chief complaint of Epigastric pain (22 May 2019 00:30)  Currently admitted to medicine with the primary diagnosis of Chest pain    Today is hospital day 1d, and this morning pt is resting comfortably and reports no overnight events. She says her pain is currently resolved, and denies any sog, lightheadedness, or dizziness.         OBJECTIVE  PAST MEDICAL & SURGICAL HISTORY  Breast cancer  Coronary artery disease  Skin cancer  Heart attack  History of bowel resection  H/O lumpectomy  History of tonsillectomy  History of cholecystectomy    ALLERGIES:  No Known Allergies    MEDICATIONS:  STANDING MEDICATIONS  aspirin  chewable 81 milliGRAM(s) Oral daily  chlorhexidine 4% Liquid 1 Application(s) Topical <User Schedule>  pantoprazole    Tablet 40 milliGRAM(s) Oral before breakfast  simvastatin 10 milliGRAM(s) Oral at bedtime  sucralfate 1 Gram(s) Oral every 6 hours    PRN MEDICATIONS      Home Medications:  omeprazole 40 mg oral delayed release capsule: 1 cap(s) orally once a day (21 May 2019 21:41)  sucralfate 1 g oral tablet: 1 tab(s) orally 4 times a day (before meals and at bedtime) (21 May 2019 21:41)      VITAL SIGNS: Last 24 Hours  T(C): 36 (22 May 2019 05:54), Max: 36.4 (21 May 2019 19:16)  T(F): 96.8 (22 May 2019 05:54), Max: 97.5 (21 May 2019 19:16)  HR: 64 (22 May 2019 05:54) (64 - 84)  BP: 152/70 (22 May 2019 05:54) (138/69 - 152/70)  BP(mean): --  RR: 16 (22 May 2019 05:54) (16 - 18)  SpO2: 98% (21 May 2019 19:16) (97% - 98%)    LABS:                        14.4   5.10  )-----------( 161      ( 22 May 2019 04:54 )             42.8     05-22    142  |  107  |  15  ----------------------------<  77  4.4   |  23  |  0.8    Ca    9.6      22 May 2019 04:54  Mg     2.0     05-22    TPro  6.2  /  Alb  3.9  /  TBili  0.9  /  DBili  x   /  AST  23  /  ALT  18  /  AlkPhos  81  05-22          Creatine Kinase, Serum: 94 U/L (05-22-19 @ 04:54)  Troponin T, Serum: 0.04 ng/mL <HH> (05-22-19 @ 04:54)  Troponin T, Serum: 0.04 ng/mL <HH> (05-22-19 @ 01:00)  Troponin T, Serum: 0.05 ng/mL <HH> (05-21-19 @ 17:30)  Lactate, Blood: 0.9 mmol/L (05-21-19 @ 17:30)      CARDIAC MARKERS ( 22 May 2019 04:54 )  x     / 0.04 ng/mL / 94 U/L / x     / 3.6 ng/mL  CARDIAC MARKERS ( 22 May 2019 01:00 )  x     / 0.04 ng/mL / x     / x     / x      CARDIAC MARKERS ( 21 May 2019 17:30 )  x     / 0.05 ng/mL / x     / x     / x          I&O's Summary        PHYSICAL EXAM:    General: Not in distress.   HEENT: Moist mucus membranes. PERRLA.  Cardio: Regular rate and rhythm, S1, S2, no murmurs  Pulm: Clear to auscultation bilaterally. No wheezing, or rhonchi  Abdomen: Soft, non-tender, non-distended.   Extremities: No cyanosis or edema bilaterally.   Neuro: A&O x3. No focal deficits.       RADIOLOGY:    < from: Xray Chest 1 View AP/PA (03.06.18 @ 11:45) >  Impression:      No radiographic evidence of acutecardiopulmonary disease.    < end of copied text >

## 2019-05-22 NOTE — DISCHARGE NOTE PROVIDER - NSDCCPCAREPLAN_GEN_ALL_CORE_FT
PRINCIPAL DISCHARGE DIAGNOSIS  Diagnosis: Chest pain  Assessment and Plan of Treatment: Your EKG, cardiac enzymes, and stress test were all negative. Follow up with your cardiologist (Dr. Smith) within 2 weeks of discharge. PRINCIPAL DISCHARGE DIAGNOSIS  Diagnosis: Chest pain  Assessment and Plan of Treatment: Follow up with your cardiologist (Dr. Smith) within 2 weeks of discharge. PRINCIPAL DISCHARGE DIAGNOSIS  Diagnosis: Chest pain  Assessment and Plan of Treatment: LHC did not reveal any abnormalities.   Follow up with your cardiologist (Dr. Smith) within 2 weeks of discharge.

## 2019-05-22 NOTE — DISCHARGE NOTE PROVIDER - CARE PROVIDER_API CALL
Francis Smith)  Cardiovascular Disease; Internal Medicine  [address] [place] [address], [address]  [place] [address]  Phone: [phone]  Fax: [phone]  Follow Up Time:

## 2019-05-22 NOTE — CONSULT NOTE ADULT - SUBJECTIVE AND OBJECTIVE BOX
HISTORY OF PRESENT ILLNESS:     This  is a 69-year-old female with a PMH of stress related cardiomyopathy (takatsubu) around 7 years ago with normal cardiac cath at that time,  PUD, hiatal hernia, breast cancer s/p lumpectomy, rectal adenoma s/p resection, diverticulosis presented with a 2-day history of abdominal/chest pain.  She reports developing pain in her infra-mammary area,  described as a pressure-like sensation associated with crampy epigastric and upper abdominal pain, intermittent and lasts about 30 seconds at a time before resolving on its own. not related to food or exertion, last time was triggered after pulling some stuff from floor.     Seen at bedside, currently asymptomatic denies sob or palpitations, no syncope.   EKG showed normal sinus rhythm T wave inversion in III, avf and V4-V6, trop 0.05  hemodynamically stable     PAST MEDICAL & SURGICAL HISTORY:  Breast cancer  Skin cancer  Heart attack  History of bowel resection  H/O lumpectomy  History of tonsillectomy  History of cholecystectomy    FAMILY HISTORY:  No pertinent family history in first degree relatives    Allergies    No Known Allergies    Intolerances    	  Home Medications:  omeprazole 40 mg oral delayed release capsule: 1 cap(s) orally once a day (21 May 2019 21:41)  sucralfate 1 g oral tablet: 1 tab(s) orally 4 times a day (before meals and at bedtime) (21 May 2019 21:41)    MEDICATIONS  (STANDING):  aspirin  chewable 81 milliGRAM(s) Oral daily  chlorhexidine 4% Liquid 1 Application(s) Topical <User Schedule>  pantoprazole    Tablet 40 milliGRAM(s) Oral before breakfast  simvastatin 10 milliGRAM(s) Oral at bedtime  sucralfate 1 Gram(s) Oral every 6 hours    MEDICATIONS  (PRN):           REVIEW OF SYSTEMS:  CONSTITUTIONAL: No fever, weight loss, or fatigue  CARDIOLOGY: PAtient denies  shortness of breath or syncopal episodes.   RESPIRATORY: denies shortness of breath, wheezeing.   NEUROLOGICAL: NO weakness, no focal deficits to report.  GI: no BRBPR, no N,V,diarrhea.    PSYCHIATRY: normal mood and affect  HEENT: no nasal discharge, no ecchymosis  SKIN: no ecchymosis, no breakdown  MUSCULOSKELETAL: Full range of motion x4.      PHYSICAL EXAM:  T(C): 35.7 (05-21-19 @ 22:39), Max: 36.4 (05-21-19 @ 19:16)  HR: 75 (05-21-19 @ 22:39) (70 - 84)  BP: 140/77 (05-21-19 @ 22:39) (138/69 - 141/71)  RR: 16 (05-21-19 @ 22:39) (16 - 18)  SpO2: 98% (05-21-19 @ 19:16) (97% - 98%)  Wt(kg): --  I&O's Summary    Daily Height in cm: 170.18 (21 May 2019 22:39)    Daily     General Appearance: Normal	  Cardiovascular: Normal S1 S2, No JVD, No murmurs, No edema  Respiratory: Lungs clear to auscultation	  Psychiatry: A & O x 3, Mood & affect appropriate  Gastrointestinal:  Soft, Non-tender  Skin: No rashes, No ecchymoses, No cyanosis	  Neurologic: Non-focal  Extremities: Normal range of motion, No clubbing, cyanosis or edema  Vascular: Peripheral pulses palpable 2+ bilaterally        LABS:	 	                        15.2   6.52  )-----------( 207      ( 21 May 2019 17:30 )             44.6     05-21    139  |  103  |  19  ----------------------------<  99  4.5   |  21  |  0.8    Ca    10.0      21 May 2019 17:30  Mg     2.0     05-21    TPro  7.3  /  Alb  4.7  /  TBili  0.7  /  DBili  x   /  AST  29  /  ALT  22  /  AlkPhos  97  05-21          CARDIAC MARKERS:  Troponin T, Serum: 0.05 ng/mL (05-21 @ 17:30)            TELEMETRY EVENTS: 	sinus rhythm no major events     ECG:  < from: 12 Lead ECG (05.21.19 @ 17:33) >  Normal sinus rhythm  Right atrial enlargement  T wave abnormality, consider inferior ischemia  Abnormal ECG    < end of copied text >  	    	    PREVIOUS DIAGNOSTIC TESTING:    [ ] Echocardiogram:  normal EF at baseline HISTORY OF PRESENT ILLNESS:     This  is a 69-year-old female with a PMH of stress related cardiomyopathy (takatsubu) around 7 years ago with normal cardiac cath at that time,  PUD, hiatal hernia, breast cancer s/p lumpectomy, rectal adenoma s/p resection, diverticulosis presented with a 2-day history of abdominal/chest pain.  She reports developing pain in her infra-mammary area,  described as a pressure-like sensation associated with crampy epigastric and upper abdominal pain, intermittent and lasts about 30 seconds at a time before resolving on its own. not related to food or exertion, last time was triggered after pulling some stuff from floor.     Seen at bedside, currently asymptomatic denies sob or palpitations, no syncope.   EKG showed normal sinus rhythm T wave inversion in III, avf and V4-V6, trop 0.05  hemodynamically stable     PAST MEDICAL & SURGICAL HISTORY:  Breast cancer  Skin cancer  Heart attack  History of bowel resection  H/O lumpectomy  History of tonsillectomy  History of cholecystectomy    FAMILY HISTORY:  No pertinent family history in first degree relatives    SOCIAL HISTORY:  Unremarkable    Allergies    No Known Allergies    Intolerances    	  Home Medications:  omeprazole 40 mg oral delayed release capsule: 1 cap(s) orally once a day (21 May 2019 21:41)  sucralfate 1 g oral tablet: 1 tab(s) orally 4 times a day (before meals and at bedtime) (21 May 2019 21:41)    MEDICATIONS  (STANDING):  aspirin  chewable 81 milliGRAM(s) Oral daily  chlorhexidine 4% Liquid 1 Application(s) Topical <User Schedule>  pantoprazole    Tablet 40 milliGRAM(s) Oral before breakfast  simvastatin 10 milliGRAM(s) Oral at bedtime  sucralfate 1 Gram(s) Oral every 6 hours    MEDICATIONS  (PRN):           REVIEW OF SYSTEMS:  CONSTITUTIONAL: No fever, weight loss, or fatigue  CARDIOLOGY: PAtient denies  shortness of breath or syncopal episodes.   RESPIRATORY: denies shortness of breath, wheezeing.   NEUROLOGICAL: NO weakness, no focal deficits to report.  GI: no BRBPR, no N,V,diarrhea.    PSYCHIATRY: normal mood and affect  HEENT: no nasal discharge, no ecchymosis  SKIN: no ecchymosis, no breakdown  MUSCULOSKELETAL: Full range of motion x4.      PHYSICAL EXAM:  T(C): 35.7 (05-21-19 @ 22:39), Max: 36.4 (05-21-19 @ 19:16)  HR: 75 (05-21-19 @ 22:39) (70 - 84)  BP: 140/77 (05-21-19 @ 22:39) (138/69 - 141/71)  RR: 16 (05-21-19 @ 22:39) (16 - 18)  SpO2: 98% (05-21-19 @ 19:16) (97% - 98%)  Wt(kg): --  I&O's Summary    Daily Height in cm: 170.18 (21 May 2019 22:39)    Daily     General: W\D W\N AAO x 3 Appearance: Normal	  HEENMT: WNL  Neck: No JVD   Cardiovascular: Normal S1 S2, No JVD, No murmurs, No edema  Respiratory: Lungs clear to auscultation	  Psychiatry: A & O x 3, Mood & affect appropriate  Gastrointestinal:  Soft, Non-tender  Skin: No rashes, No ecchymoses, No cyanosis	  Neurologic: Non-focal  Extremities: Normal range of motion, No clubbing, cyanosis or edema  Vascular: Peripheral pulses palpable 2+ bilaterally        LABS:	 	                        15.2   6.52  )-----------( 207      ( 21 May 2019 17:30 )             44.6     05-21    139  |  103  |  19  ----------------------------<  99  4.5   |  21  |  0.8    Ca    10.0      21 May 2019 17:30  Mg     2.0     05-21    TPro  7.3  /  Alb  4.7  /  TBili  0.7  /  DBili  x   /  AST  29  /  ALT  22  /  AlkPhos  97  05-21          CARDIAC MARKERS:  Troponin T, Serum: 0.05 ng/mL (05-21 @ 17:30)            TELEMETRY EVENTS: 	sinus rhythm no major events     ECG:  < from: 12 Lead ECG (05.21.19 @ 17:33) >  Normal sinus rhythm  Right atrial enlargement  T wave abnormality, consider inferior ischemia  Abnormal ECG    < end of copied text >  	    	    PREVIOUS DIAGNOSTIC TESTING:    [ ] Echocardiogram:  normal EF at baseline

## 2019-05-22 NOTE — DISCHARGE NOTE PROVIDER - HOSPITAL COURSE
Pt presnted with epigastric pain, had negative trops, EKG unremarkable. Received an adenosine stress test that came back negative. SHe will eb discharged with plan to follow up with her cardiologist DR. Smith. Etiology of the pain is likely muscoskeletal. Pt presnted with epigastric pain, had negative trops, EKG unremarkable. Received an adenosine stress test that . SHe will eb discharged with plan to follow up with her cardiologist DR. Smith. Pt presnted with epigastric pain, had negative trops, EKG unremarkable. Received an adenosine stress test that .    Cardiac cath was done on 5/23 which showed     Left main normal short        LAD:  minor irregularities                         Diag: normal        Left Circumflex: minor irregularities     OM: normal         Right Coronary Artery: normal     RPDA patent         No intervention was done. Can be d/c'ed from cardiac stand point.         follow up with her cardiologist DR. Smith.

## 2019-05-22 NOTE — PROGRESS NOTE ADULT - ASSESSMENT
The patient is a 69-year-old female with a PMH of CAD, PUD, hiatal hernia, breast cancer s/p lumpectomy, rectal adenoma s/p resection, diverticulosis, and "skin cancer" who presented with a 2-day history of epigastric pain.    #Epigastric pain r/o ACS  - DDx includes PUD vs. unstable angina vs. musculoskeletal etiology  - troponin: 0.05, 0.04, 0.04  - EKG: NSR with Q-waves in inferior leads, T-wave inversions in leads III, aVF, and V3-V6  - previous EKGs on record revealed similar Q-waves in inferior leads  - Adenosine stress test to r/o ischemia      #History of PUD  - continue Carafate and  protonix 40mg PO    #DVT prophylaxis  - bilateral SCDs

## 2019-05-22 NOTE — CONSULT NOTE ADULT - ASSESSMENT
-Atypical chest pain, r/o ischemia vs musculoskeletal, hemodynamically stable, currently asymptomatic    Plan:  tele  asa  statin   2decho   trend CE  PPI  will consider ischemic workup prior to discharge according to further test results    *will continue to follow -Atypical chest pain, r/o ischemia vs musculoskeletal, hemodynamically stable, currently asymptomatic    Plan:  tele  asa  statin   2decho   trend CE  PPI  Nuclear Stress Test

## 2019-05-22 NOTE — PATIENT PROFILE ADULT - HARM RISK FACTORS
patient with complain of neck and lower back pain with nausea, vomiting and dizziness in the morning, denies trauma
no

## 2019-05-23 ENCOUNTER — TRANSCRIPTION ENCOUNTER (OUTPATIENT)
Age: 69
End: 2019-05-23

## 2019-05-23 VITALS — TEMPERATURE: 98 F | HEART RATE: 82 BPM | DIASTOLIC BLOOD PRESSURE: 82 MMHG | SYSTOLIC BLOOD PRESSURE: 159 MMHG

## 2019-05-23 LAB
ALBUMIN SERPL ELPH-MCNC: 4.4 G/DL — SIGNIFICANT CHANGE UP (ref 3.5–5.2)
ALP SERPL-CCNC: 87 U/L — SIGNIFICANT CHANGE UP (ref 30–115)
ALT FLD-CCNC: 18 U/L — SIGNIFICANT CHANGE UP (ref 0–41)
ANION GAP SERPL CALC-SCNC: 13 MMOL/L — SIGNIFICANT CHANGE UP (ref 7–14)
AST SERPL-CCNC: 21 U/L — SIGNIFICANT CHANGE UP (ref 0–41)
BILIRUB SERPL-MCNC: 0.9 MG/DL — SIGNIFICANT CHANGE UP (ref 0.2–1.2)
BUN SERPL-MCNC: 22 MG/DL — HIGH (ref 10–20)
CALCIUM SERPL-MCNC: 9.6 MG/DL — SIGNIFICANT CHANGE UP (ref 8.5–10.1)
CHLORIDE SERPL-SCNC: 106 MMOL/L — SIGNIFICANT CHANGE UP (ref 98–110)
CO2 SERPL-SCNC: 20 MMOL/L — SIGNIFICANT CHANGE UP (ref 17–32)
CREAT SERPL-MCNC: 0.8 MG/DL — SIGNIFICANT CHANGE UP (ref 0.7–1.5)
GLUCOSE SERPL-MCNC: 104 MG/DL — HIGH (ref 70–99)
HCT VFR BLD CALC: 43.9 % — SIGNIFICANT CHANGE UP (ref 37–47)
HCV AB S/CO SERPL IA: 0.19 S/CO — SIGNIFICANT CHANGE UP (ref 0–0.99)
HCV AB SERPL-IMP: SIGNIFICANT CHANGE UP
HGB BLD-MCNC: 15.1 G/DL — SIGNIFICANT CHANGE UP (ref 12–16)
MCHC RBC-ENTMCNC: 31.3 PG — HIGH (ref 27–31)
MCHC RBC-ENTMCNC: 34.4 G/DL — SIGNIFICANT CHANGE UP (ref 32–37)
MCV RBC AUTO: 91.1 FL — SIGNIFICANT CHANGE UP (ref 81–99)
NRBC # BLD: 0 /100 WBCS — SIGNIFICANT CHANGE UP (ref 0–0)
PLATELET # BLD AUTO: 187 K/UL — SIGNIFICANT CHANGE UP (ref 130–400)
POTASSIUM SERPL-MCNC: 4.3 MMOL/L — SIGNIFICANT CHANGE UP (ref 3.5–5)
POTASSIUM SERPL-SCNC: 4.3 MMOL/L — SIGNIFICANT CHANGE UP (ref 3.5–5)
PROT SERPL-MCNC: 6.8 G/DL — SIGNIFICANT CHANGE UP (ref 6–8)
RBC # BLD: 4.82 M/UL — SIGNIFICANT CHANGE UP (ref 4.2–5.4)
RBC # FLD: 12.3 % — SIGNIFICANT CHANGE UP (ref 11.5–14.5)
SODIUM SERPL-SCNC: 139 MMOL/L — SIGNIFICANT CHANGE UP (ref 135–146)
WBC # BLD: 5.44 K/UL — SIGNIFICANT CHANGE UP (ref 4.8–10.8)
WBC # FLD AUTO: 5.44 K/UL — SIGNIFICANT CHANGE UP (ref 4.8–10.8)

## 2019-05-23 PROCEDURE — 99232 SBSQ HOSP IP/OBS MODERATE 35: CPT

## 2019-05-23 PROCEDURE — 93458 L HRT ARTERY/VENTRICLE ANGIO: CPT | Mod: 26

## 2019-05-23 RX ORDER — SODIUM CHLORIDE 9 MG/ML
1000 INJECTION, SOLUTION INTRAVENOUS
Refills: 0 | Status: DISCONTINUED | OUTPATIENT
Start: 2019-05-23 | End: 2019-05-23

## 2019-05-23 RX ADMIN — SODIUM CHLORIDE 75 MILLILITER(S): 9 INJECTION, SOLUTION INTRAVENOUS at 10:45

## 2019-05-23 RX ADMIN — PANTOPRAZOLE SODIUM 40 MILLIGRAM(S): 20 TABLET, DELAYED RELEASE ORAL at 05:43

## 2019-05-23 RX ADMIN — Medication 1 GRAM(S): at 05:41

## 2019-05-23 RX ADMIN — Medication 81 MILLIGRAM(S): at 11:55

## 2019-05-23 RX ADMIN — Medication 1 GRAM(S): at 03:01

## 2019-05-23 NOTE — PROGRESS NOTE ADULT - ASSESSMENT
69-year-old female with a PMH of CAD, PUD, hiatal hernia, breast cancer s/p lumpectomy, rectal adenoma s/p resection, diverticulosis, and "skin cancer" who presented with a 2-day history of epigastric pain.    #Abnormal stress test  - Findings consistent with LV inferolateral apical wall ischemia on Adenosine stress test. T.I.D index 1.6  - Pt scheduled for cardiac catheterization today. Was kept NPO since midnight.  - troponin: 0.05, 0.04, 0.04  - EKG: NSR with Q-waves in inferior leads, T-wave inversions in leads III, aVF, and V3-V6      #History of PUD  - continue Carafate and  protonix 40mg PO    #DVT prophylaxis  - bilateral SCDs

## 2019-05-23 NOTE — PROGRESS NOTE ADULT - SUBJECTIVE AND OBJECTIVE BOX
GERARDO COCHRAN 69y Female  MRN#: [id number]         SUBJECTIVE  Patient is a 69y old Female who presents with a chief complaint of Epigastric pain (22 May 2019 14:42)  Currently admitted to medicine with the primary diagnosis of Chest pain    Today is hospital day 2d, and this morning pt is resting comfortably. Her stress test came back positive, and she reports being slightly concerned about the results. She agrees with the plan of going to Cath today. Denies any chest pain, palpitations, SOB, dizziness, or lightheadedness at this time.        OBJECTIVE  PAST MEDICAL & SURGICAL HISTORY  Breast cancer  Coronary artery disease  Skin cancer  Heart attack  History of bowel resection  H/O lumpectomy  History of tonsillectomy  History of cholecystectomy    ALLERGIES:  No Known Allergies    MEDICATIONS:  STANDING MEDICATIONS  aspirin  chewable 81 milliGRAM(s) Oral daily  chlorhexidine 4% Liquid 1 Application(s) Topical <User Schedule>  pantoprazole    Tablet 40 milliGRAM(s) Oral before breakfast  simvastatin 10 milliGRAM(s) Oral at bedtime  sucralfate 1 Gram(s) Oral every 6 hours    PRN MEDICATIONS      Home Medications:  aspirin 81 mg oral tablet, chewable: 1 tab(s) orally once a day (22 May 2019 14:43)  omeprazole 40 mg oral delayed release capsule: 1 cap(s) orally once a day (21 May 2019 21:41)  simvastatin 10 mg oral tablet: 1 tab(s) orally once a day (at bedtime) (22 May 2019 14:43)  sucralfate 1 g oral tablet: 1 tab(s) orally 4 times a day (before meals and at bedtime) (21 May 2019 21:41)      VITAL SIGNS: Last 24 Hours  T(C): 36.2 (23 May 2019 05:13), Max: 36.2 (22 May 2019 16:09)  T(F): 97.1 (23 May 2019 05:13), Max: 97.1 (22 May 2019 16:09)  HR: 64 (23 May 2019 05:13) (64 - 86)  BP: 120/65 (23 May 2019 05:13) (120/65 - 163/94)  BP(mean): --  RR: 18 (23 May 2019 05:13) (16 - 18)  SpO2: --    LABS:                        15.1   5.44  )-----------( 187      ( 23 May 2019 07:47 )             43.9     05-23    139  |  106  |  22<H>  ----------------------------<  104<H>  4.3   |  20  |  0.8    Ca    9.6      23 May 2019 07:47  Mg     2.0     05-22    TPro  6.8  /  Alb  4.4  /  TBili  0.9  /  DBili  x   /  AST  21  /  ALT  18  /  AlkPhos  87  05-23              CARDIAC MARKERS ( 22 May 2019 04:54 )  x     / 0.04 ng/mL / 94 U/L / x     / 3.6 ng/mL  CARDIAC MARKERS ( 22 May 2019 01:00 )  x     / 0.04 ng/mL / x     / x     / x      CARDIAC MARKERS ( 21 May 2019 17:30 )  x     / 0.05 ng/mL / x     / x     / x          I&O's Summary    22 May 2019 07:01  -  23 May 2019 07:00  --------------------------------------------------------  IN: 400 mL / OUT: 450 mL / NET: -50 mL          PHYSICAL EXAM:    General: Not in distress. Pleasant  HEENT: Moist mucus membranes. PERRLA.  Cardio: Regular rate and rhythm, S1, S2, no murmurs  Pulm: Clear to auscultation bilaterally. No wheezing, or rhonchi  Abdomen: Soft, non-tender, non-distended.   Extremities: No cyanosis or edema bilaterally. .  Neuro: A&O x3. No focal deficits.       RADIOLOGY:    < from: Xray Chest 1 View AP/PA (03.06.18 @ 11:45) >  Impression:      No radiographic evidence of acutecardiopulmonary disease.    < end of copied text >    < from: NM Nuclear Stress Pharmacologic Multiple (05.22.19 @ 16:01) >  Impression:   1.   Moderate size severe reversible defect in the inferolateral apical   wall of the left ventricle consistent with ischemia.  2.  Transient ischemic dilatation (t.i.d. index 1.61) which makes the   finding of ischemia more clinically significant.  3.  Normal left ventricular wall motion and wall thickening.  4.  Left ventricular ejection fraction calculated as >80% which is within   the range of normal or in the hyperdynamic range    < end of copied text >      < from: Transthoracic Echocardiogram (05.22.19 @ 14:35) >  Summary:   1. Left ventricular ejection fraction, by visual estimation, is 55 to   60%.   2. Spectral Doppler shows impaired relaxation pattern of left   ventricular myocardial filling (Grade I diastolic dysfunction).    < end of copied text >

## 2019-05-23 NOTE — CHART NOTE - NSCHARTNOTEFT_GEN_A_CORE
PRE-OP DIAGNOSIS: abnormal nuclear stress test     PROCEDURE: [facility]    Physician: Dr Jacinto  Assistant: Dr Francisco Sheth    ANESTHESIA TYPE:  [  ]General Anesthesia  [  ] Sedation  [ x ] Local/Regional    CONDITION  [  ] Critical  [  ] Serious  [  ]Fair  [ x ]Good      IV CONTRAST:    30      mL      FINDINGS    Left Heart Catheterization:  LVEDP: normal         LEFT HEART CATHERIZATION                                    Left main normal short    LAD:  minor irregularities                       Diag: normal    Left Circumflex: minor irregularities   OM: normal     Right Cornary Artery: normal   RPDA patent       INTERVENTION  none           PLAN OF CARE    [x ] Return to In-patient bed PRE-OP DIAGNOSIS: abnormal nuclear stress test     PROCEDURE: [facility]    Physician: Dr Jacinto  Assistant: Dr Francisco Sheth    ANESTHESIA TYPE:  [  ]General Anesthesia  [  ] Sedation  [ x ] Local/Regional    CONDITION  [  ] Critical  [  ] Serious  [  ]Fair  [ x ]Good      IV CONTRAST:    30      mL      FINDINGS    Left Heart Catheterization:  LVEDP: normal         LEFT HEART CATHETERIZATION                                    Left main normal short    LAD:  minor irregularities                       Diag: normal    Left Circumflex: minor irregularities   OM: normal     Right Coronary Artery: normal   RPDA patent       INTERVENTION  none           PLAN OF CARE    [x ] Return to In-patient bed

## 2019-05-23 NOTE — PROGRESS NOTE ADULT - SUBJECTIVE AND OBJECTIVE BOX
Patient was seen and examined. Spoke with RN. Chart reviewed.    No events overnight.  Vital Signs Last 24 Hrs  T(F): 96.7 (23 May 2019 13:46), Max: 97.1 (22 May 2019 16:09)  HR: 66 (23 May 2019 13:46) (64 - 86)  BP: 130/58 (23 May 2019 13:46) (120/65 - 163/94)  SpO2: --  MEDICATIONS  (STANDING):  aspirin  chewable 81 milliGRAM(s) Oral daily  chlorhexidine 4% Liquid 1 Application(s) Topical <User Schedule>  dextrose 5% + sodium chloride 0.45%. 1000 milliLiter(s) (75 mL/Hr) IV Continuous <Continuous>  pantoprazole    Tablet 40 milliGRAM(s) Oral before breakfast  simvastatin 10 milliGRAM(s) Oral at bedtime  sucralfate 1 Gram(s) Oral every 6 hours    MEDICATIONS  (PRN):    Labs:                        15.1   5.44  )-----------( 187      ( 23 May 2019 07:47 )             43.9                         14.4   5.10  )-----------( 161      ( 22 May 2019 04:54 )             42.8     23 May 2019 07:47    139    |  106    |  22     ----------------------------<  104    4.3     |  20     |  0.8    22 May 2019 04:54    142    |  107    |  15     ----------------------------<  77     4.4     |  23     |  0.8      Ca    9.6        23 May 2019 07:47  Ca    9.6        22 May 2019 04:54  Mg     2.0       22 May 2019 04:54  Mg     2.0       21 May 2019 17:30    TPro  6.8    /  Alb  4.4    /  TBili  0.9    /  DBili  x      /  AST  21     /  ALT  18     /  AlkPhos  87     23 May 2019 07:47  TPro  6.2    /  Alb  3.9    /  TBili  0.9    /  DBili  x      /  AST  23     /  ALT  18     /  AlkPhos  81     22 May 2019 04:54            Radiology:    General: comfortable, NAD  Neurology: A&Ox3, nonfocal  Head:  Normocephalic, atraumatic  ENT:  Mucosa moist, no ulcerations  Neck:  Supple, no JVD,   Skin: no breakdowns (as per RN)  Resp: CTA B/L  CV: RRR, S1S2,   GI: Soft, NT, bowel sounds  MS: No edema, + peripheral pulses, FROM all 4 extremity

## 2019-05-23 NOTE — PROGRESS NOTE ADULT - SUBJECTIVE AND OBJECTIVE BOX
SUBJ:No chest pain or shortness of breath      MEDICATIONS  (STANDING):  aspirin  chewable 81 milliGRAM(s) Oral daily  chlorhexidine 4% Liquid 1 Application(s) Topical <User Schedule>  pantoprazole    Tablet 40 milliGRAM(s) Oral before breakfast  simvastatin 10 milliGRAM(s) Oral at bedtime  sucralfate 1 Gram(s) Oral every 6 hours    MEDICATIONS  (PRN):            Vital Signs Last 24 Hrs  T(C): 36.2 (23 May 2019 05:13), Max: 36.2 (22 May 2019 16:09)  T(F): 97.1 (23 May 2019 05:13), Max: 97.1 (22 May 2019 16:09)  HR: 64 (23 May 2019 05:13) (64 - 86)  BP: 120/65 (23 May 2019 05:13) (120/65 - 163/94)  BP(mean): --  RR: 18 (23 May 2019 05:13) (16 - 18)  SpO2: --    REVIEW OF SYSTEMS:  CONSTITUTIONAL: No fever, weight loss, or fatigue  EYES: No eye pain, visual disturbances, or discharge  ENMT:  No difficulty hearing, tinnitus, vertigo; No sinus or throat pain  NECK: No pain or stiffness  RESPIRATORY: No cough, wheezing, chills or hemoptysis; No shortness of breath  CARDIOVASCULAR: No chest pain, palpitations, dizziness, or leg swelling  GASTROINTESTINAL: Abdominal , epigastric pain  NEUROLOGICAL: No headaches, memory loss, loss of strength, numbness, or tremors  SKIN: No itching, burning, rashes, or lesions   MUSCULOSKELETAL: No joint pain or swelling; No muscle, back, or extremity pain  PSYCHIATRIC: No depression, anxiety, mood swings, or difficulty sleeping    PHYSICAL EXAM:  · CONSTITUTIONAL: Well-developed, well nourished AAO x 3  · EYES:	EOMI; PERRL; no drainage or redness  · HEENMT: No oral lesions; no gross abnormalities  · NECK:	No bruits; no thyromegaly or nodules  · BACK:	No deformity or limitation of movement  · RESPIRATORY:   airway patent; breath sounds equal; good air movement; respirations non-labored; clear to auscultation bilaterally; no chest wall tenderness; no intercostal retractions; no rales,rhonchi or wheeze  · CARDIOVASCULAR: S1\S2 regular rate and rhythm  no rub  no murmur  normal PMI  . GASTROINTESTINAL:  no distention; no masses palpable; bowel sounds normal; no rebound tenderness; no guarding; no rigidity; no organomegaly  · EXTREMITIES: No cyanosis, clubbing or edema  · NEUROLOGICAL: alert and oriented x 3; normal strength      LABS:                        15.1   5.44  )-----------( 187      ( 23 May 2019 07:47 )             43.9     05-23    139  |  106  |  22<H>  ----------------------------<  104<H>  4.3   |  20  |  0.8    Ca    9.6      23 May 2019 07:47  Mg     2.0     05-22    TPro  6.8  /  Alb  4.4  /  TBili  0.9  /  DBili  x   /  AST  21  /  ALT  18  /  AlkPhos  87  05-23    CARDIAC MARKERS ( 22 May 2019 04:54 )  x     / 0.04 ng/mL / 94 U/L / x     / 3.6 ng/mL  CARDIAC MARKERS ( 22 May 2019 01:00 )  x     / 0.04 ng/mL / x     / x     / x      CARDIAC MARKERS ( 21 May 2019 17:30 )  x     / 0.05 ng/mL / x     / x     / x        Nuclear  Large reversible anterior defect with TID C\W Ischemia      I&O's Summary    22 May 2019 07:01  -  23 May 2019 07:00  --------------------------------------------------------  IN: 400 mL / OUT: 450 mL / NET: -50 mL      BNP  RADIOLOGY & ADDITIONAL STUDIES:    IMPRESSION AND PLAN:    - Abnormal Nuclear Stress Test     Continue telemetry  Continue ASA 81mg daily   Continue statin   NPO for Cardiac Cath     Discussed with patient, housestaff

## 2019-05-23 NOTE — DISCHARGE NOTE NURSING/CASE MANAGEMENT/SOCIAL WORK - NSDCDPATPORTLINK_GEN_ALL_CORE
You can access the [facility] Patient Portal, offered by [facility], by registering with the following website: [email or web address]

## 2019-05-29 DIAGNOSIS — K27.9 PEPTIC ULCER, SITE UNSPECIFIED, UNSPECIFIED AS ACUTE OR CHRONIC, WITHOUT HEMORRHAGE OR PERFORATION: ICD-10-CM

## 2019-05-29 DIAGNOSIS — I25.2 OLD MYOCARDIAL INFARCTION: ICD-10-CM

## 2019-05-29 DIAGNOSIS — Z85.3 PERSONAL HISTORY OF MALIGNANT NEOPLASM OF BREAST: ICD-10-CM

## 2019-05-29 DIAGNOSIS — R94.31 ABNORMAL ELECTROCARDIOGRAM [ECG] [EKG]: ICD-10-CM

## 2019-05-29 DIAGNOSIS — Z90.49 ACQUIRED ABSENCE OF OTHER SPECIFIED PARTS OF DIGESTIVE TRACT: ICD-10-CM

## 2019-05-29 DIAGNOSIS — Z86.010 PERSONAL HISTORY OF COLONIC POLYPS: ICD-10-CM

## 2019-05-29 DIAGNOSIS — R07.9 CHEST PAIN, UNSPECIFIED: ICD-10-CM

## 2019-05-29 DIAGNOSIS — R94.39 ABNORMAL RESULT OF OTHER CARDIOVASCULAR FUNCTION STUDY: ICD-10-CM

## 2019-05-29 DIAGNOSIS — Z85.828 PERSONAL HISTORY OF OTHER MALIGNANT NEOPLASM OF SKIN: ICD-10-CM

## 2019-05-29 DIAGNOSIS — I25.10 ATHEROSCLEROTIC HEART DISEASE OF NATIVE CORONARY ARTERY WITHOUT ANGINA PECTORIS: ICD-10-CM

## 2019-05-29 DIAGNOSIS — K57.30 DIVERTICULOSIS OF LARGE INTESTINE WITHOUT PERFORATION OR ABSCESS WITHOUT BLEEDING: ICD-10-CM

## 2019-05-29 DIAGNOSIS — R07.89 OTHER CHEST PAIN: ICD-10-CM

## 2019-05-29 DIAGNOSIS — I77.1 STRICTURE OF ARTERY: ICD-10-CM

## 2019-05-29 DIAGNOSIS — Z79.82 LONG TERM (CURRENT) USE OF ASPIRIN: ICD-10-CM

## 2019-05-29 DIAGNOSIS — K44.9 DIAPHRAGMATIC HERNIA WITHOUT OBSTRUCTION OR GANGRENE: ICD-10-CM

## 2019-06-11 ENCOUNTER — APPOINTMENT (OUTPATIENT)
Dept: CARDIOLOGY | Facility: CLINIC | Age: 69
End: 2019-06-11

## 2019-06-11 ENCOUNTER — APPOINTMENT (OUTPATIENT)
Dept: CARDIOLOGY | Facility: CLINIC | Age: 69
End: 2019-06-11
Payer: MEDICARE

## 2019-06-11 VITALS — RESPIRATION RATE: 18 BRPM | HEART RATE: 68 BPM

## 2019-06-11 VITALS — DIASTOLIC BLOOD PRESSURE: 80 MMHG | SYSTOLIC BLOOD PRESSURE: 130 MMHG

## 2019-06-11 VITALS — WEIGHT: 196 LBS | HEIGHT: 67 IN | BODY MASS INDEX: 30.76 KG/M2

## 2019-06-11 PROCEDURE — 99213 OFFICE O/P EST LOW 20 MIN: CPT

## 2019-06-11 PROCEDURE — 93000 ELECTROCARDIOGRAM COMPLETE: CPT

## 2019-06-11 NOTE — PHYSICAL EXAM
[General Appearance - Well Developed] : well developed [Normal Appearance] : normal appearance [Well Groomed] : well groomed [General Appearance - Well Nourished] : well nourished [No Deformities] : no deformities [General Appearance - In No Acute Distress] : no acute distress [Normal Conjunctiva] : the conjunctiva exhibited no abnormalities [Eyelids - No Xanthelasma] : the eyelids demonstrated no xanthelasmas [Normal Oral Mucosa] : normal oral mucosa [No Oral Pallor] : no oral pallor [No Oral Cyanosis] : no oral cyanosis [Normal Jugular Venous A Waves Present] : normal jugular venous A waves present [Normal Jugular Venous V Waves Present] : normal jugular venous V waves present [No Jugular Venous Garcia A Waves] : no jugular venous garcia A waves [Heart Rate And Rhythm] : heart rate and rhythm were normal [Heart Sounds] : normal S1 and S2 [Murmurs] : no murmurs present [Respiration, Rhythm And Depth] : normal respiratory rhythm and effort [Exaggerated Use Of Accessory Muscles For Inspiration] : no accessory muscle use [Bowel Sounds] : normal bowel sounds [Auscultation Breath Sounds / Voice Sounds] : lungs were clear to auscultation bilaterally [Abdomen Soft] : soft [Abdomen Tenderness] : non-tender [Abdomen Mass (___ Cm)] : no abdominal mass palpated [Abnormal Walk] : normal gait [Nail Clubbing] : no clubbing of the fingernails [Petechial Hemorrhages (___cm)] : no petechial hemorrhages [Cyanosis, Localized] : no localized cyanosis [Skin Color & Pigmentation] : normal skin color and pigmentation [] : no rash [No Venous Stasis] : no venous stasis [No Skin Ulcers] : no skin ulcer [Skin Lesions] : no skin lesions [No Xanthoma] : no  xanthoma was observed [Oriented To Time, Place, And Person] : oriented to person, place, and time

## 2019-06-17 ENCOUNTER — APPOINTMENT (OUTPATIENT)
Dept: CARDIOLOGY | Facility: CLINIC | Age: 69
End: 2019-06-17

## 2019-06-20 ENCOUNTER — APPOINTMENT (OUTPATIENT)
Dept: GASTROENTEROLOGY | Facility: CLINIC | Age: 69
End: 2019-06-20
Payer: MEDICARE

## 2019-06-20 VITALS
SYSTOLIC BLOOD PRESSURE: 150 MMHG | HEIGHT: 68 IN | HEART RATE: 90 BPM | WEIGHT: 200 LBS | BODY MASS INDEX: 30.31 KG/M2 | DIASTOLIC BLOOD PRESSURE: 90 MMHG

## 2019-06-20 DIAGNOSIS — R10.9 UNSPECIFIED ABDOMINAL PAIN: ICD-10-CM

## 2019-06-20 DIAGNOSIS — Z80.0 FAMILY HISTORY OF MALIGNANT NEOPLASM OF DIGESTIVE ORGANS: ICD-10-CM

## 2019-06-20 DIAGNOSIS — Z86.69 PERSONAL HISTORY OF OTHER DISEASES OF THE NERVOUS SYSTEM AND SENSE ORGANS: ICD-10-CM

## 2019-06-20 DIAGNOSIS — Z85.828 PERSONAL HISTORY OF OTHER MALIGNANT NEOPLASM OF SKIN: ICD-10-CM

## 2019-06-20 PROCEDURE — 99204 OFFICE O/P NEW MOD 45 MIN: CPT

## 2019-06-20 RX ORDER — OMEPRAZOLE 20 MG/1
20 CAPSULE, DELAYED RELEASE ORAL
Refills: 0 | Status: DISCONTINUED | COMMUNITY
End: 2019-06-20

## 2019-06-20 NOTE — HISTORY OF PRESENT ILLNESS
[Heartburn] : heartburn [Abdominal Pain] : abdominal pain [GERD] : gastroesophageal reflux disease [Hiatus Hernia] : hiatus hernia [de-identified] : 69 year old female pt with hx of MI, not on medications, comes in with upper abdominal pain of 1 month duration. No nausea, no vomiting, no blood in stools, no weight loss, no changes in bowel habits. Episodic pain, never at bight.\par \par Last EGD 2017: hiatal hernia, esophagitis\par Last colonoscopy 2017: diverticulosis, hemorrhoids. \par Last labs in may 2019: CBC, CMP unremarkable.

## 2019-06-20 NOTE — ASSESSMENT
[FreeTextEntry1] : 69 year old female pt with hx of MI, not on medications, comes in with upper abdominal pain of 1 month duration. No nausea, no vomiting, no blood in stools, no weight loss, no changes in bowel habits. Episodic pain, never at bight.\par \par Last EGD 2017: hiatal hernia, esophagitis\par Last colonoscopy 2017: diverticulosis, hemorrhoids. \par Last labs in may 2019: CBC, CMP unremarkable.\par \par \par Rec: U/S of abdomen\par EGD:  Risks and benefits discussed with patient.\par \par

## 2019-06-20 NOTE — REVIEW OF SYSTEMS
[Loss Of Hearing] : hearing loss [FreeTextEntry3] : glasses [FreeTextEntry2] : fatigue [FreeTextEntry9] : knee pain [de-identified] : dry skin,scaling

## 2019-06-25 ENCOUNTER — TRANSCRIPTION ENCOUNTER (OUTPATIENT)
Age: 69
End: 2019-06-25

## 2019-06-25 NOTE — ASU PATIENT PROFILE, ADULT - PMH
Acid reflux    Acute knee pain  pain and swelling right knee  Breast cancer    Coronary artery disease    Heart attack    History of acute myocardial infarction    Skin cancer

## 2019-06-25 NOTE — ASU PATIENT PROFILE, ADULT - PSH
H/O lumpectomy    History of bowel resection    History of cholecystectomy    History of surgery  jaw surgery  History of tonsillectomy

## 2019-06-26 ENCOUNTER — RX RENEWAL (OUTPATIENT)
Age: 69
End: 2019-06-26

## 2019-06-26 ENCOUNTER — RESULT REVIEW (OUTPATIENT)
Age: 69
End: 2019-06-26

## 2019-06-26 ENCOUNTER — OUTPATIENT (OUTPATIENT)
Dept: OUTPATIENT SERVICES | Facility: HOSPITAL | Age: 69
LOS: 1 days | Discharge: HOME | End: 2019-06-26
Payer: MEDICARE

## 2019-06-26 ENCOUNTER — TRANSCRIPTION ENCOUNTER (OUTPATIENT)
Age: 69
End: 2019-06-26

## 2019-06-26 VITALS
HEIGHT: 67 IN | OXYGEN SATURATION: 98 % | RESPIRATION RATE: 16 BRPM | DIASTOLIC BLOOD PRESSURE: 79 MMHG | TEMPERATURE: 96 F | WEIGHT: 199.96 LBS | HEART RATE: 74 BPM | SYSTOLIC BLOOD PRESSURE: 164 MMHG

## 2019-06-26 VITALS — SYSTOLIC BLOOD PRESSURE: 142 MMHG | DIASTOLIC BLOOD PRESSURE: 77 MMHG

## 2019-06-26 DIAGNOSIS — Z98.890 OTHER SPECIFIED POSTPROCEDURAL STATES: Chronic | ICD-10-CM

## 2019-06-26 PROCEDURE — 88312 SPECIAL STAINS GROUP 1: CPT | Mod: 26

## 2019-06-26 PROCEDURE — 43239 EGD BIOPSY SINGLE/MULTIPLE: CPT

## 2019-06-26 PROCEDURE — 88305 TISSUE EXAM BY PATHOLOGIST: CPT | Mod: 26

## 2019-06-26 RX ORDER — SUCRALFATE 1 G
1 TABLET ORAL
Qty: 0 | Refills: 0 | DISCHARGE

## 2019-06-26 NOTE — ASU DISCHARGE PLAN (ADULT/PEDIATRIC) - CARE PROVIDER_API CALL
Jyoti Pitts)  Internal Medicine  [address] [place] [address]  Phone: [phone]  Fax: [phone]  Follow Up Time:

## 2019-06-26 NOTE — PRE-ANESTHESIA EVALUATION ADULT - NSANTHPMHFT_GEN_ALL_CORE
Chart reviewed, pt interviewed and examined.  MI 10 years ago. seen by cardiologist 2 weeks ago, all wnl

## 2019-06-28 LAB — SURGICAL PATHOLOGY STUDY: SIGNIFICANT CHANGE UP

## 2019-06-30 DIAGNOSIS — K44.9 DIAPHRAGMATIC HERNIA WITHOUT OBSTRUCTION OR GANGRENE: ICD-10-CM

## 2019-06-30 DIAGNOSIS — K20.8 OTHER ESOPHAGITIS: ICD-10-CM

## 2019-06-30 DIAGNOSIS — R10.13 EPIGASTRIC PAIN: ICD-10-CM

## 2019-06-30 DIAGNOSIS — K29.50 UNSPECIFIED CHRONIC GASTRITIS WITHOUT BLEEDING: ICD-10-CM

## 2019-07-02 PROBLEM — K21.9 GASTRO-ESOPHAGEAL REFLUX DISEASE WITHOUT ESOPHAGITIS: Chronic | Status: ACTIVE | Noted: 2019-06-26

## 2019-07-02 PROBLEM — I25.2 OLD MYOCARDIAL INFARCTION: Chronic | Status: ACTIVE | Noted: 2019-06-26

## 2019-07-02 PROBLEM — M25.569 PAIN IN UNSPECIFIED KNEE: Chronic | Status: ACTIVE | Noted: 2019-06-26

## 2019-07-11 ENCOUNTER — EMERGENCY (EMERGENCY)
Facility: HOSPITAL | Age: 69
LOS: 0 days | Discharge: HOME | End: 2019-07-11
Attending: EMERGENCY MEDICINE | Admitting: EMERGENCY MEDICINE
Payer: MEDICARE

## 2019-07-11 ENCOUNTER — MOBILE ON CALL (OUTPATIENT)
Age: 69
End: 2019-07-11

## 2019-07-11 VITALS
OXYGEN SATURATION: 96 % | TEMPERATURE: 98 F | RESPIRATION RATE: 18 BRPM | DIASTOLIC BLOOD PRESSURE: 89 MMHG | HEIGHT: 67 IN | HEART RATE: 90 BPM | WEIGHT: 199.96 LBS | SYSTOLIC BLOOD PRESSURE: 130 MMHG

## 2019-07-11 VITALS
TEMPERATURE: 98 F | RESPIRATION RATE: 18 BRPM | DIASTOLIC BLOOD PRESSURE: 82 MMHG | SYSTOLIC BLOOD PRESSURE: 138 MMHG | OXYGEN SATURATION: 97 % | HEART RATE: 84 BPM

## 2019-07-11 DIAGNOSIS — R19.5 OTHER FECAL ABNORMALITIES: ICD-10-CM

## 2019-07-11 DIAGNOSIS — Z98.890 OTHER SPECIFIED POSTPROCEDURAL STATES: Chronic | ICD-10-CM

## 2019-07-11 LAB
ABO RH CONFIRMATION: SIGNIFICANT CHANGE UP
ALBUMIN SERPL ELPH-MCNC: 4.3 G/DL — SIGNIFICANT CHANGE UP (ref 3.5–5.2)
ALP SERPL-CCNC: 74 U/L — SIGNIFICANT CHANGE UP (ref 30–115)
ALT FLD-CCNC: 15 U/L — SIGNIFICANT CHANGE UP (ref 0–41)
ANION GAP SERPL CALC-SCNC: 7 MMOL/L — SIGNIFICANT CHANGE UP (ref 7–14)
APPEARANCE UR: CLEAR — SIGNIFICANT CHANGE UP
AST SERPL-CCNC: 22 U/L — SIGNIFICANT CHANGE UP (ref 0–41)
BACTERIA # UR AUTO: ABNORMAL
BASOPHILS # BLD AUTO: 0.05 K/UL — SIGNIFICANT CHANGE UP (ref 0–0.2)
BASOPHILS NFR BLD AUTO: 0.7 % — SIGNIFICANT CHANGE UP (ref 0–1)
BILIRUB DIRECT SERPL-MCNC: <0.2 MG/DL — SIGNIFICANT CHANGE UP (ref 0–0.2)
BILIRUB INDIRECT FLD-MCNC: >0.3 MG/DL — SIGNIFICANT CHANGE UP (ref 0.2–1.2)
BILIRUB SERPL-MCNC: 0.5 MG/DL — SIGNIFICANT CHANGE UP (ref 0.2–1.2)
BILIRUB UR-MCNC: NEGATIVE — SIGNIFICANT CHANGE UP
BLD GP AB SCN SERPL QL: SIGNIFICANT CHANGE UP
BUN SERPL-MCNC: 22 MG/DL — HIGH (ref 10–20)
CALCIUM SERPL-MCNC: 10.2 MG/DL — HIGH (ref 8.5–10.1)
CHLORIDE SERPL-SCNC: 105 MMOL/L — SIGNIFICANT CHANGE UP (ref 98–110)
CO2 SERPL-SCNC: 28 MMOL/L — SIGNIFICANT CHANGE UP (ref 17–32)
COD CRY URNS QL: NEGATIVE — SIGNIFICANT CHANGE UP
COLOR SPEC: YELLOW — SIGNIFICANT CHANGE UP
CREAT SERPL-MCNC: 0.9 MG/DL — SIGNIFICANT CHANGE UP (ref 0.7–1.5)
DIFF PNL FLD: NEGATIVE — SIGNIFICANT CHANGE UP
EOSINOPHIL # BLD AUTO: 0.18 K/UL — SIGNIFICANT CHANGE UP (ref 0–0.7)
EOSINOPHIL NFR BLD AUTO: 2.5 % — SIGNIFICANT CHANGE UP (ref 0–8)
EPI CELLS # UR: ABNORMAL /HPF
GLUCOSE SERPL-MCNC: 109 MG/DL — HIGH (ref 70–99)
GLUCOSE UR QL: NEGATIVE MG/DL — SIGNIFICANT CHANGE UP
GRAN CASTS # UR COMP ASSIST: NEGATIVE — SIGNIFICANT CHANGE UP
HCT VFR BLD CALC: 39.9 % — SIGNIFICANT CHANGE UP (ref 37–47)
HGB BLD-MCNC: 13.4 G/DL — SIGNIFICANT CHANGE UP (ref 12–16)
HYALINE CASTS # UR AUTO: NEGATIVE — SIGNIFICANT CHANGE UP
IMM GRANULOCYTES NFR BLD AUTO: 0.3 % — SIGNIFICANT CHANGE UP (ref 0.1–0.3)
KETONES UR-MCNC: NEGATIVE — SIGNIFICANT CHANGE UP
LEUKOCYTE ESTERASE UR-ACNC: ABNORMAL
LIDOCAIN IGE QN: 38 U/L — SIGNIFICANT CHANGE UP (ref 7–60)
LYMPHOCYTES # BLD AUTO: 1.64 K/UL — SIGNIFICANT CHANGE UP (ref 1.2–3.4)
LYMPHOCYTES # BLD AUTO: 22.8 % — SIGNIFICANT CHANGE UP (ref 20.5–51.1)
MCHC RBC-ENTMCNC: 31.5 PG — HIGH (ref 27–31)
MCHC RBC-ENTMCNC: 33.6 G/DL — SIGNIFICANT CHANGE UP (ref 32–37)
MCV RBC AUTO: 93.7 FL — SIGNIFICANT CHANGE UP (ref 81–99)
MONOCYTES # BLD AUTO: 0.75 K/UL — HIGH (ref 0.1–0.6)
MONOCYTES NFR BLD AUTO: 10.4 % — HIGH (ref 1.7–9.3)
NEUTROPHILS # BLD AUTO: 4.56 K/UL — SIGNIFICANT CHANGE UP (ref 1.4–6.5)
NEUTROPHILS NFR BLD AUTO: 63.3 % — SIGNIFICANT CHANGE UP (ref 42.2–75.2)
NITRITE UR-MCNC: NEGATIVE — SIGNIFICANT CHANGE UP
NRBC # BLD: 0 /100 WBCS — SIGNIFICANT CHANGE UP (ref 0–0)
PH UR: 5.5 — SIGNIFICANT CHANGE UP (ref 5–8)
PLATELET # BLD AUTO: 184 K/UL — SIGNIFICANT CHANGE UP (ref 130–400)
POTASSIUM SERPL-MCNC: 4.5 MMOL/L — SIGNIFICANT CHANGE UP (ref 3.5–5)
POTASSIUM SERPL-SCNC: 4.5 MMOL/L — SIGNIFICANT CHANGE UP (ref 3.5–5)
PROT SERPL-MCNC: 6.6 G/DL — SIGNIFICANT CHANGE UP (ref 6–8)
PROT UR-MCNC: NEGATIVE MG/DL — SIGNIFICANT CHANGE UP
RBC # BLD: 4.26 M/UL — SIGNIFICANT CHANGE UP (ref 4.2–5.4)
RBC # FLD: 12.6 % — SIGNIFICANT CHANGE UP (ref 11.5–14.5)
RBC CASTS # UR COMP ASSIST: ABNORMAL /HPF
SODIUM SERPL-SCNC: 140 MMOL/L — SIGNIFICANT CHANGE UP (ref 135–146)
SP GR SPEC: 1.02 — SIGNIFICANT CHANGE UP (ref 1.01–1.03)
TRI-PHOS CRY UR QL COMP ASSIST: NEGATIVE — SIGNIFICANT CHANGE UP
URATE CRY FLD QL MICRO: NEGATIVE — SIGNIFICANT CHANGE UP
UROBILINOGEN FLD QL: 0.2 MG/DL — SIGNIFICANT CHANGE UP (ref 0.2–0.2)
WBC # BLD: 7.2 K/UL — SIGNIFICANT CHANGE UP (ref 4.8–10.8)
WBC # FLD AUTO: 7.2 K/UL — SIGNIFICANT CHANGE UP (ref 4.8–10.8)
WBC UR QL: ABNORMAL /HPF

## 2019-07-11 PROCEDURE — 99284 EMERGENCY DEPT VISIT MOD MDM: CPT

## 2019-07-11 RX ORDER — ACETAMINOPHEN 500 MG
650 TABLET ORAL ONCE
Refills: 0 | Status: COMPLETED | OUTPATIENT
Start: 2019-07-11 | End: 2019-07-11

## 2019-07-11 RX ADMIN — Medication 650 MILLIGRAM(S): at 18:30

## 2019-07-11 RX ADMIN — Medication 650 MILLIGRAM(S): at 18:48

## 2019-07-11 NOTE — ED PROVIDER NOTE - OBJECTIVE STATEMENT
alert oriented x 3with abdominal yesterday ,none today ,blood in stool, no nausea and no vomiting 69 year old F with hx of CAD s/p MI, breast ca, PUD c/o 1 day of black stools. Sts has had 3 episodes total. Pt had recent egd on 06/26 with Dr. Pitts and was told she had 4 esophageal ulcers. Pt not on AC/AP, no hx of GI bleed, fever/chills, abdominal pain, bright red rectal bleeding, nausea, vomiting, chest pain, sob.

## 2019-07-11 NOTE — ED PROVIDER NOTE - NS ED ROS FT
Constitutional: no fever, chills, no recent weight loss, change in appetite or malaise  Eyes: no redness/discharge/pain/vision changes  Cardiac: No chest pain, SOB or edema.  Respiratory: No cough or respiratory distress  GI: + black stools. No nausea, vomiting, diarrhea or abdominal pain.  : No dysuria, frequency, urgency or hematuria  MS: no pain to back or extremities, no loss of ROM, no weakness  Neuro: No headache or weakness. No LOC.  Skin: No skin rash.  Except as documented in the HPI, all other systems are negative.

## 2019-07-11 NOTE — ED PROVIDER NOTE - CARE PROVIDER_API CALL
Jyoti Pitts)  Internal Medicine  [address] [place] [address]  Phone: [phone]  Fax: [phone]  Follow Up Time:

## 2019-07-11 NOTE — ED PROVIDER NOTE - PHYSICAL EXAMINATION
CONSTITUTIONAL: Well-appearing; well-nourished; in no apparent distress.   NECK: Supple; non-tender; no cervical lymphadenopathy.   CARDIOVASCULAR: Normal S1, S2; no murmurs, rubs, or gallops.   RESPIRATORY: Normal chest excursion with respiration; breath sounds clear and equal bilaterally; no wheezes, rhonchi, or rales.  GI/: Normal bowel sounds; non-distended; non-tender; No rebound or guarding. no palpable organomegaly. Rectal exam chaperoned by RN: External hemorrhoids noted. Brown stool. No melena. No brbpr.   MS: No evidence of trauma or deformity. Normal ROM in all four extremities; non-tender to palpation; distal pulses are normal.   SKIN: Normal for age and race; warm; dry; good turgor; no apparent lesions or exudate.   NEURO/PSYCH: A & O x 4; grossly unremarkable. mood and manner are appropriate.

## 2019-07-11 NOTE — ED PROVIDER NOTE - ATTENDING CONTRIBUTION TO CARE
I personally evaluated the patient. I reviewed the Resident’s or Physician Assistant’s note (as assigned above), and agree with the findings and plan except as documented in my note.    69F with PMHx PUD, CAD/MI, breast CA, p/w 3 episodes of dark stool since yesterday. No bright red blood. Has chronic epigastric pain, not worse than normal. Pt had recent EGD 3 weeks ago, has 4 ulcers in esophagus. Not on AC, antiplatelets. No prior GIB. Denies lightheadedness, SOB. No fever, nausea, vomiting, dysuria, chest pain, SOB.    Exam: VS reviewed. Patient well appearing, NAD. NCAT. Anicteric. MMM. Supple, non tender. Normal respiratory effort, CTA B/L, no rhonchi, rales, crackles. RRR. No murmurs. Abdomen soft, NDNT, no guarding or rebound. Brisk cap refill. 2+ radial pulses. No pedal edema. Skin warm and dry. AAOx3. No gross FND. Rectal exam per PA: External hemorrhoids noted. Brown stool. No melena. No brbpr.

## 2019-07-11 NOTE — ED PROVIDER NOTE - CLINICAL SUMMARY MEDICAL DECISION MAKING FREE TEXT BOX
69F with PUD here for 3 episodes of dark stool yesterday. No lightheadedness, SOB, chest pain, nausea, vomiting. Rectal showing brown stool, no bright red blood. Stable throughout ED course. Hb stable. Will follow up with GI.  Patient to be discharged from ED. Any available test results were discussed with patient and/or family. Verbal instructions given, including instructions to return to ED immediately for any new, worsening, or concerning symptoms. Patient endorsed understanding. Written discharge instructions additionally given, including follow-up plan.

## 2019-07-11 NOTE — ED ADULT TRIAGE NOTE - NS ED NURSE DIRECT TO ROOM YN
REVIEW OF SYSTEMS:    CONSTITUTIONAL: No weakness, fevers or chills  EYES/ENT: No visual changes;  No vertigo or throat pain   NECK: No pain or stiffness  RESPIRATORY: No cough, wheezing, hemoptysis; +ve  shortness of breath  CARDIOVASCULAR: No palpitations  GASTROINTESTINAL: No abdominal or epigastric pain. No nausea, vomiting, or hematemesis; No diarrhea or constipation. No melena or hematochezia.  GENITOURINARY: No dysuria, frequency or hematuria  NEUROLOGICAL: No numbness or weakness  SKIN: No itching, rashes
Yes

## 2019-07-22 ENCOUNTER — APPOINTMENT (OUTPATIENT)
Dept: GASTROENTEROLOGY | Facility: CLINIC | Age: 69
End: 2019-07-22

## 2019-09-12 ENCOUNTER — APPOINTMENT (OUTPATIENT)
Dept: GASTROENTEROLOGY | Facility: CLINIC | Age: 69
End: 2019-09-12
Payer: MEDICARE

## 2019-09-12 VITALS
BODY MASS INDEX: 30.31 KG/M2 | HEART RATE: 68 BPM | WEIGHT: 200 LBS | DIASTOLIC BLOOD PRESSURE: 80 MMHG | SYSTOLIC BLOOD PRESSURE: 150 MMHG | HEIGHT: 68 IN

## 2019-09-12 DIAGNOSIS — K20.9 ESOPHAGITIS, UNSPECIFIED: ICD-10-CM

## 2019-09-12 PROCEDURE — 99214 OFFICE O/P EST MOD 30 MIN: CPT

## 2019-09-12 NOTE — PHYSICAL EXAM
[General Appearance - Alert] : alert [Sclera] : the sclera and conjunctiva were normal [Thyroid Diffuse Enlargement] : the thyroid was not enlarged [Auscultation Breath Sounds / Voice Sounds] : lungs were clear to auscultation bilaterally [Bowel Sounds] : normal bowel sounds [Abdomen Tenderness] : non-tender [Abdomen Soft] : soft [Abdomen Mass (___ Cm)] : no abdominal mass palpated [] : no hepato-splenomegaly [No CVA Tenderness] : no ~M costovertebral angle tenderness [Motor Tone] : muscle strength and tone were normal [Skin Color & Pigmentation] : normal skin color and pigmentation [Motor Exam] : the motor exam was normal [Oriented To Time, Place, And Person] : oriented to person, place, and time

## 2019-09-12 NOTE — HISTORY OF PRESENT ILLNESS
[_________] : Performed [unfilled] [de-identified] : 68 y/o female patient above average risk for CRC, with hx of GERD, no alarm signs\par s/p EGD with LA class C esophagitis, currently on pantoprazole and famotidine, with complete response.\par Last colonoscopy in 2017 was unremarkable.

## 2019-09-12 NOTE — ASSESSMENT
[FreeTextEntry1] : 70 y/o female patient above average risk for CRC, with hx of GERD, no alarm signs\par s/p EGD with LA class C esophagitis, currently on pantoprazole and famotidine, with complete response.\par Last colonoscopy in 2017 was unremarkable.\par \par Rec: EGD for documentation of esophagitis healing and r/o underlying Rapp's \par Risks and benefits discussed with patient.\par Colonoscopy due in 2022.

## 2019-09-30 NOTE — ED PROVIDER NOTE - MDM PATIENT STATEMENT FOR ADDL TREATMENT
Anesthesia Volume In Cc: 3 Patient with one or more new problems requiring additional work-up/treatment.

## 2019-10-05 NOTE — H&P ADULT - NSHPSOURCEINFORD_GEN_ALL_CORE
Chart(s)/Patient Received pt in bed, opens eyes with suctioning/ pain stimulation. when asked questions, he said \"yes\", doesn't move any extremities on verbal commands.  Pt with abnormal flexion in upper extremities with painful stimulations, at times abnormal extension; a

## 2019-10-23 ENCOUNTER — TRANSCRIPTION ENCOUNTER (OUTPATIENT)
Age: 69
End: 2019-10-23

## 2019-10-23 ENCOUNTER — RESULT REVIEW (OUTPATIENT)
Age: 69
End: 2019-10-23

## 2019-10-23 ENCOUNTER — OUTPATIENT (OUTPATIENT)
Dept: OUTPATIENT SERVICES | Facility: HOSPITAL | Age: 69
LOS: 1 days | Discharge: HOME | End: 2019-10-23
Payer: MEDICARE

## 2019-10-23 VITALS
DIASTOLIC BLOOD PRESSURE: 66 MMHG | OXYGEN SATURATION: 96 % | RESPIRATION RATE: 17 BRPM | TEMPERATURE: 97 F | HEART RATE: 64 BPM | HEIGHT: 68 IN | WEIGHT: 199.96 LBS | SYSTOLIC BLOOD PRESSURE: 118 MMHG

## 2019-10-23 VITALS — HEART RATE: 68 BPM | RESPIRATION RATE: 18 BRPM | DIASTOLIC BLOOD PRESSURE: 78 MMHG | SYSTOLIC BLOOD PRESSURE: 128 MMHG

## 2019-10-23 DIAGNOSIS — Z98.890 OTHER SPECIFIED POSTPROCEDURAL STATES: Chronic | ICD-10-CM

## 2019-10-23 PROCEDURE — 43239 EGD BIOPSY SINGLE/MULTIPLE: CPT

## 2019-10-23 PROCEDURE — 88305 TISSUE EXAM BY PATHOLOGIST: CPT | Mod: 26

## 2019-10-23 NOTE — ASU DISCHARGE PLAN (ADULT/PEDIATRIC) - CARE PROVIDER_API CALL
Jyoti Pitts)  Internal Medicine  [address] [place] [address]  Phone: [phone]  Fax: [phone]  Follow Up Time:

## 2019-10-23 NOTE — PRE-ANESTHESIA EVALUATION ADULT - NSANTHOSAYNRD_GEN_A_CORE
No. TOMER screening performed.  STOP BANG Legend: 0-2 = LOW Risk; 3-4 = INTERMEDIATE Risk; 5-8 = HIGH Risk

## 2019-10-25 LAB — SURGICAL PATHOLOGY STUDY: SIGNIFICANT CHANGE UP

## 2019-10-29 DIAGNOSIS — R10.9 UNSPECIFIED ABDOMINAL PAIN: ICD-10-CM

## 2019-10-29 DIAGNOSIS — K29.70 GASTRITIS, UNSPECIFIED, WITHOUT BLEEDING: ICD-10-CM

## 2019-10-29 DIAGNOSIS — Z85.828 PERSONAL HISTORY OF OTHER MALIGNANT NEOPLASM OF SKIN: ICD-10-CM

## 2019-10-29 DIAGNOSIS — K21.9 GASTRO-ESOPHAGEAL REFLUX DISEASE WITHOUT ESOPHAGITIS: ICD-10-CM

## 2019-10-29 DIAGNOSIS — M25.569 PAIN IN UNSPECIFIED KNEE: ICD-10-CM

## 2019-10-29 DIAGNOSIS — Z90.49 ACQUIRED ABSENCE OF OTHER SPECIFIED PARTS OF DIGESTIVE TRACT: ICD-10-CM

## 2019-10-29 DIAGNOSIS — K44.9 DIAPHRAGMATIC HERNIA WITHOUT OBSTRUCTION OR GANGRENE: ICD-10-CM

## 2019-10-29 DIAGNOSIS — I25.10 ATHEROSCLEROTIC HEART DISEASE OF NATIVE CORONARY ARTERY WITHOUT ANGINA PECTORIS: ICD-10-CM

## 2019-10-29 DIAGNOSIS — I25.2 OLD MYOCARDIAL INFARCTION: ICD-10-CM

## 2019-10-29 DIAGNOSIS — K22.70 BARRETT'S ESOPHAGUS WITHOUT DYSPLASIA: ICD-10-CM

## 2019-10-29 DIAGNOSIS — Z85.3 PERSONAL HISTORY OF MALIGNANT NEOPLASM OF BREAST: ICD-10-CM

## 2019-11-03 ENCOUNTER — TRANSCRIPTION ENCOUNTER (OUTPATIENT)
Age: 69
End: 2019-11-03

## 2019-12-26 NOTE — DISCHARGE NOTE ADULT - NS MD DC PLAN IMMU FLU PROVIDE INFO
Risks/benefits discussed with patient or patient surrogate/Vaccine Information Sheet (VIS) provided-VIS date: 8/07/15 cough

## 2020-01-09 ENCOUNTER — APPOINTMENT (OUTPATIENT)
Dept: GASTROENTEROLOGY | Facility: CLINIC | Age: 70
End: 2020-01-09

## 2020-03-16 ENCOUNTER — APPOINTMENT (OUTPATIENT)
Dept: GASTROENTEROLOGY | Facility: CLINIC | Age: 70
End: 2020-03-16
Payer: MEDICARE

## 2020-03-16 PROCEDURE — 99214 OFFICE O/P EST MOD 30 MIN: CPT

## 2020-03-16 RX ORDER — PANTOPRAZOLE 40 MG/1
40 TABLET, DELAYED RELEASE ORAL DAILY
Qty: 30 | Refills: 3 | Status: DISCONTINUED | COMMUNITY
Start: 2019-06-26 | End: 2020-03-16

## 2020-03-16 RX ORDER — SUCRALFATE 1 G/1
1 TABLET ORAL
Refills: 0 | Status: DISCONTINUED | COMMUNITY
End: 2020-03-16

## 2020-03-16 RX ORDER — ESOMEPRAZOLE MAGNESIUM 40 MG/1
40 CAPSULE, DELAYED RELEASE ORAL
Qty: 30 | Refills: 6 | Status: DISCONTINUED | COMMUNITY
Start: 2019-06-20 | End: 2020-03-16

## 2020-03-16 RX ORDER — FAMOTIDINE 40 MG/1
40 TABLET, FILM COATED ORAL
Qty: 30 | Refills: 3 | Status: DISCONTINUED | COMMUNITY
Start: 2019-06-26 | End: 2020-03-16

## 2020-03-16 NOTE — HISTORY OF PRESENT ILLNESS
[_________] : Performed [unfilled] [de-identified] : 68 y/o female patient above average risk for CRC, with hx of GERD, no alarm signs\par s/p EGD with LA class C esophagitis, currently on pantoprazole and famotidine, with complete response.\par Last colonoscopy in 2017 was unremarkable.\par currently reports full relief on PPI/H2B\par No alarm signs.

## 2020-03-16 NOTE — ASSESSMENT
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 BE\par Colonoscopy due in 2022.

## 2020-06-02 ENCOUNTER — TRANSCRIPTION ENCOUNTER (OUTPATIENT)
Age: 70
End: 2020-06-02

## 2020-06-12 ENCOUNTER — APPOINTMENT (OUTPATIENT)
Dept: CARDIOLOGY | Facility: CLINIC | Age: 70
End: 2020-06-12

## 2020-06-16 ENCOUNTER — APPOINTMENT (OUTPATIENT)
Dept: CARDIOLOGY | Facility: CLINIC | Age: 70
End: 2020-06-16

## 2020-08-07 ENCOUNTER — RX RENEWAL (OUTPATIENT)
Age: 70
End: 2020-08-07

## 2020-09-15 ENCOUNTER — APPOINTMENT (OUTPATIENT)
Dept: GASTROENTEROLOGY | Facility: CLINIC | Age: 70
End: 2020-09-15

## 2020-09-15 ENCOUNTER — APPOINTMENT (OUTPATIENT)
Dept: GASTROENTEROLOGY | Facility: CLINIC | Age: 70
End: 2020-09-15
Payer: MEDICARE

## 2020-09-15 DIAGNOSIS — Z12.11 ENCOUNTER FOR SCREENING FOR MALIGNANT NEOPLASM OF COLON: ICD-10-CM

## 2020-09-15 DIAGNOSIS — K21.9 GASTRO-ESOPHAGEAL REFLUX DISEASE W/OUT ESOPHAGITIS: ICD-10-CM

## 2020-09-15 DIAGNOSIS — K22.70 BARRETT'S ESOPHAGUS W/OUT DYSPLASIA: ICD-10-CM

## 2020-09-15 DIAGNOSIS — K63.5 POLYP OF COLON: ICD-10-CM

## 2020-09-15 PROCEDURE — 99214 OFFICE O/P EST MOD 30 MIN: CPT | Mod: 95

## 2020-09-15 RX ORDER — PANTOPRAZOLE 40 MG/1
40 TABLET, DELAYED RELEASE ORAL
Qty: 90 | Refills: 3 | Status: DISCONTINUED | COMMUNITY
Start: 2019-09-12 | End: 2020-09-15

## 2020-09-15 RX ORDER — FAMOTIDINE 40 MG/1
40 TABLET, FILM COATED ORAL
Qty: 90 | Refills: 3 | Status: DISCONTINUED | COMMUNITY
Start: 2019-09-12 | End: 2020-09-15

## 2020-09-15 NOTE — PHYSICAL EXAM
[General Appearance - Alert] : alert [Oriented To Time, Place, And Person] : oriented to person, place, and time [] : no respiratory distress [Hearing Threshold Finger Rub Not [place]] : hearing was normal

## 2020-09-15 NOTE — ASSESSMENT
[FreeTextEntry1] : 70 y/o female patient above average risk for CRC, hx of complicated colon polyp s/p resection,  hx of GERD, s/p EGD with LA class C esophagitis, currently on pantoprazole and famotidine, with complete response.\par Last colonoscopy in 2017 was unremarkable. next recommended in 3 years\par currently reports full relief on famotidine twice daily, after repeat EGD with complete healing of esophagitis and BE on EGD with no IM on path. \par No alarm signs.\par \par A/P: Brando's on EGD, no IM on path\par will repeat EGD with GEJ bx\par \par Complicated colon polyp s/p resection\par Due for surveillance colonoscopy\par Risks and benefits discussed with patient.\par

## 2020-09-15 NOTE — HISTORY OF PRESENT ILLNESS
[Home] : at home, [unfilled] , at the time of the visit. [Medical Office: ([facility])___] : at the medical office located in  [Verbal consent obtained from patient] : the patient, [unfilled] [_________] : Performed [unfilled] [FreeTextEntry4] : Bridget Michael [de-identified] : 70 y/o female patient above average risk for CRC, hx of complicated colon polyp s/p resection,  hx of GERD, s/p EGD with LA class C esophagitis, currently on pantoprazole and famotidine, with complete response.\par Last colonoscopy in 2017 was unremarkable. next recommended in 3 years\par currently reports full relief on famotidine twice daily, after repeat EGD with complete healing of esophagitis and BE on EGD with no IM on path. \par No alarm signs.

## 2020-09-21 NOTE — PATIENT PROFILE ADULT. - HEALTH ADVICE, FACTORS THAT PREVENT FOLLOWING, PROFILE
Routing to PCP for further review/recommendations/orders.  Please see 9/1/20 DEXA Results and advise      Mirtha Briggs RN  [facility]  [place]  
See my chart message result note.   
none

## 2020-09-26 ENCOUNTER — APPOINTMENT (OUTPATIENT)
Dept: CARDIOLOGY | Facility: CLINIC | Age: 70
End: 2020-09-26
Payer: MEDICARE

## 2020-09-26 PROCEDURE — 93306 TTE W/DOPPLER COMPLETE: CPT

## 2020-10-12 ENCOUNTER — APPOINTMENT (OUTPATIENT)
Dept: CARDIOLOGY | Facility: CLINIC | Age: 70
End: 2020-10-12
Payer: MEDICARE

## 2020-10-12 VITALS — RESPIRATION RATE: 18 BRPM | DIASTOLIC BLOOD PRESSURE: 80 MMHG | HEART RATE: 72 BPM | SYSTOLIC BLOOD PRESSURE: 126 MMHG

## 2020-10-12 VITALS — WEIGHT: 204 LBS | HEIGHT: 64 IN | BODY MASS INDEX: 34.83 KG/M2 | TEMPERATURE: 96.5 F

## 2020-10-12 PROCEDURE — 99214 OFFICE O/P EST MOD 30 MIN: CPT

## 2020-10-12 PROCEDURE — 93000 ELECTROCARDIOGRAM COMPLETE: CPT

## 2020-10-12 NOTE — PHYSICAL EXAM
[General Appearance - Well Developed] : well developed [Normal Appearance] : normal appearance [Well Groomed] : well groomed [General Appearance - Well Nourished] : well nourished [No Deformities] : no deformities [General Appearance - In No Acute Distress] : no acute distress [Normal Conjunctiva] : the conjunctiva exhibited no abnormalities [Eyelids - No Xanthelasma] : the eyelids demonstrated no xanthelasmas [Normal Oral Mucosa] : normal oral mucosa [No Oral Pallor] : no oral pallor [No Oral Cyanosis] : no oral cyanosis [Normal Jugular Venous A Waves Present] : normal jugular venous A waves present [Normal Jugular Venous V Waves Present] : normal jugular venous V waves present [No Jugular Venous Garcia A Waves] : no jugular venous garcia A waves [Heart Rate And Rhythm] : heart rate and rhythm were normal [Heart Sounds] : normal S1 and S2 [Murmurs] : no murmurs present [Respiration, Rhythm And Depth] : normal respiratory rhythm and effort [Exaggerated Use Of Accessory Muscles For Inspiration] : no accessory muscle use [Auscultation Breath Sounds / Voice Sounds] : lungs were clear to auscultation bilaterally [Bowel Sounds] : normal bowel sounds [Abdomen Soft] : soft [Abdomen Tenderness] : non-tender [Abdomen Mass (___ Cm)] : no abdominal mass palpated [Abnormal Walk] : normal gait [Nail Clubbing] : no clubbing of the fingernails [Cyanosis, Localized] : no localized cyanosis [Petechial Hemorrhages (___cm)] : no petechial hemorrhages [Skin Color & Pigmentation] : normal skin color and pigmentation [] : no rash [No Venous Stasis] : no venous stasis [Skin Lesions] : no skin lesions [No Skin Ulcers] : no skin ulcer [No Xanthoma] : no  xanthoma was observed [Oriented To Time, Place, And Person] : oriented to person, place, and time

## 2020-12-03 NOTE — PHYSICAL EXAM
Medicare Wellness Visit  Plan for Preventive Care    A good way for you to stay healthy is to use preventive care.  Medicare covers many services that can help you stay healthy.* The goal of these services is to find any health problems as quickly as possible. Finding problems early can help make them easier to treat.  Your personal plan below lists the services you may need and when they are due.     Health Maintenance Summary     Shingles Vaccine (2 of 3)  Overdue since 11/4/2014    Medicare Wellness Visit (Yearly)  Next due on 12/3/2021    Depression Screening (Yearly)  Next due on 12/3/2021    DTaP/Tdap/Td Vaccine (2 - Td)  Next due on 11/12/2029    Pneumococcal Vaccine 65+   Completed    Influenza Vaccine   Completed    Meningococcal Vaccine   Aged Out    HPV Vaccine   Aged Out           Preventive Care for Women and Men    Heart Screenings (Cardiovascular):  · Blood tests are used to check your cholesterol, lipid and triglyceride levels. High levels can increase your risk for heart disease and stroke. High levels can be treated with medications, diet and exercise. Lowering your levels can help keep your heart and blood vessels healthy.  Your provider will order these tests if they are needed.    · An ultrasound is done to see if you have an abdominal aortic aneurysm (AAA).  This is an enlargement of one of the main blood vessels that delivers blood to the body.   In the [place], 9,000 deaths are caused by AAA.  You may not even know you have this problem and as many as 1 in 3 people will have a serious problem if it is not treated.  Early diagnosis allows for more effective treatment and cure.  If you have a family history of AAA or are a male age 65-75 who has smoked, you are at higher risk of an AAA.  Your provider can order this test, if needed.    Colorectal Screening:  · There are many tests that are used to check for cancer of your colon and rectum. You and your provider should discuss what test  is best for you and when to have it done.  Options include:  · Screening Colonoscopy: exam of the entire colon, seen through a flexible lighted tube.  · Flexible Sigmoidoscopy: exam of the last third (sigmoid portion) of the colon and rectum, seen through a flexible lighted tube.  · Cologuard DNA stool test: a sample of your stool is used to screen for cancer and unseen blood in your stool.  · Fecal Occult Blood Test: a sample of your stool is studied to find any unseen blood    Flu Shot:  · An immunization that helps to prevent influenza (the flu). You should get this every year. The best time to get the shot is in the fall.    Pneumococcal Shot:  • Vaccines are available that can help prevent pneumococcal disease, which is any type of infection caused by Streptococcus pneumoniae bacteria.   Their use can prevent some cases of pneumonia, meningitis, and sepsis. There are two types of pneumococcal vaccines:   o Conjugate vaccines (PCV-13 or Prevnar 13®) - helps protect against the 13 types of pneumococcal bacteria that are the most common causes of serious infections in children and adults.    o Polysaccharide vaccine (PPSV23 or Aejtfxnto49®) - helps protect against 23 types of pneumococcal bacteria for patients who are recommended to get it.  These vaccines should be given at least 12 months apart.  A booster is usually not needed.     Hepatitis B Shot:  · An immunization that helps to protect people from getting Hepatitis B. Hepatitis B is a virus that spreads through contact with infected blood or body fluids. Many people with the virus do not have symptoms.  The virus can lead to serious problems, such as liver disease. Some people are at higher risk than others. Your doctor will tell you if you need this shot.     Diabetes Screening:  · A test to measure sugar (glucose) in your blood is called a fasting blood sugar. Fasting means you cannot have food or drink for at least 8 hours before the test. This test can  detect diabetes long before you may notice symptoms.    Glaucoma Screening:  · Glaucoma screening is performed by your eye doctor. The test measures the fluid pressure inside your eyes to determine if you have glaucoma.     Hepatitis C Screening:  · A blood test to see if you have the hepatitis C virus.  Hepatitis C attacks the liver and is a major cause of chronic liver disease.  Medicare will cover a single screening for all adults born between 1945 & 1965, or high risk patients (people who have injected illegal drugs or people who have had blood transfusions).  High risk patients who continue to inject illegal drugs can be screened for Hepatitis C every year.    Smoking and Tobacco-Use Cessation Counseling:  · Tobacco is the single greatest cause of disease and early death in our country today. Medication and counseling together can increase a person’s chance of quitting for good.   · Medicare covers two quitting attempts per year, with four counseling sessions per attempt (eight sessions in a 12 month period)    Preventive Screening tests for Women    Screening Mammograms and Breast Exams:  · An x-ray of your breasts to check for breast cancer before you or your doctor may be able to feel it.  If breast cancer is found early it can usually be treated with success.    Pelvic Exams and Pap Tests:  · An exam to check for cervical and vaginal cancer. A Pap test is a lab test in which cells are taken from your cervix and sent to the lab to look for signs of cervical cancer. If cancer of the cervix is found early, chances for a cure are good. Testing can generally end at age 65, or if a woman has a hysterectomy for a benign condition. Your provider may recommend more frequent testing if certain abnormal results are found.    Bone Mass Measurements:  · A painless x-ray of your bone density to see if you are at risk for a broken bone. Bone density refers to the thickness of bones or how tightly the bone tissue is  packed.    Preventive Screening tests for Men    Prostate Screening:  · Should you have a prostate cancer test (PSA)?  It is up to you to decide if you want a prostate cancer test. Talk to your clinician to find out if the test is right for you.  Things for you to consider and talk about should include:  · Benefits and harms of the test  · Your family history  · How your race/ethnicity may influence the test  · If the test may impact other medical conditions you have  · Your values on screenings and treatments    *Medicare pays for many preventive services to keep you healthy. For some of these services, you might have to pay a deductible, coinsurance, and / or copayment.  The amounts vary depending on the type of services you need and the kind of Medicare health plan you have.               [General Appearance - Alert] : alert [Sclera] : the sclera and conjunctiva were normal [Outer Ear] : the ears and nose were normal in appearance [Neck Appearance] : the appearance of the neck was normal [Bowel Sounds] : normal bowel sounds [] : no hepato-splenomegaly [Abdomen Soft] : soft [Abdomen Mass (___ Cm)] : no abdominal mass palpated [Abdomen Hernia] : no hernia was discovered [No CVA Tenderness] : no ~M costovertebral angle tenderness [Abnormal Walk] : normal gait [Skin Color & Pigmentation] : normal skin color and pigmentation [No Focal Deficits] : no focal deficits [Oriented To Time, Place, And Person] : oriented to person, place, and time [Auscultation Breath Sounds / Voice Sounds] : lungs were clear to auscultation bilaterally [Heart Sounds] : normal S1 and S2

## 2021-02-19 NOTE — REASON FOR VISIT
What Type Of Note Output Would You Prefer (Optional)?: Standard Output
How Severe Is Your Skin Lesion?: mild
Has Your Skin Lesion Been Treated?: not been treated
Is This A New Presentation, Or A Follow-Up?: Skin Lesion
[FreeTextEntry1] : Heartburn, BE, colon polyps

## 2021-04-30 RX ORDER — FAMOTIDINE 40 MG/1
40 TABLET, FILM COATED ORAL
Qty: 180 | Refills: 3 | Status: ACTIVE | COMMUNITY
Start: 2020-03-16 | End: 1900-01-01

## 2021-06-05 ENCOUNTER — EMERGENCY (EMERGENCY)
Facility: HOSPITAL | Age: 71
LOS: 0 days | Discharge: HOME | End: 2021-06-06
Attending: EMERGENCY MEDICINE | Admitting: EMERGENCY MEDICINE
Payer: MEDICARE

## 2021-06-05 VITALS
TEMPERATURE: 99 F | HEART RATE: 80 BPM | RESPIRATION RATE: 18 BRPM | DIASTOLIC BLOOD PRESSURE: 77 MMHG | HEIGHT: 67 IN | WEIGHT: 203.93 LBS | SYSTOLIC BLOOD PRESSURE: 135 MMHG | OXYGEN SATURATION: 97 %

## 2021-06-05 DIAGNOSIS — Z98.890 OTHER SPECIFIED POSTPROCEDURAL STATES: Chronic | ICD-10-CM

## 2021-06-05 DIAGNOSIS — R42 DIZZINESS AND GIDDINESS: ICD-10-CM

## 2021-06-05 DIAGNOSIS — Z85.3 PERSONAL HISTORY OF MALIGNANT NEOPLASM OF BREAST: ICD-10-CM

## 2021-06-05 DIAGNOSIS — Z79.899 OTHER LONG TERM (CURRENT) DRUG THERAPY: ICD-10-CM

## 2021-06-05 DIAGNOSIS — Z85.038 PERSONAL HISTORY OF OTHER MALIGNANT NEOPLASM OF LARGE INTESTINE: ICD-10-CM

## 2021-06-05 DIAGNOSIS — K21.9 GASTRO-ESOPHAGEAL REFLUX DISEASE WITHOUT ESOPHAGITIS: ICD-10-CM

## 2021-06-05 DIAGNOSIS — I25.10 ATHEROSCLEROTIC HEART DISEASE OF NATIVE CORONARY ARTERY WITHOUT ANGINA PECTORIS: ICD-10-CM

## 2021-06-05 DIAGNOSIS — Z20.822 CONTACT WITH AND (SUSPECTED) EXPOSURE TO COVID-19: ICD-10-CM

## 2021-06-05 DIAGNOSIS — I25.2 OLD MYOCARDIAL INFARCTION: ICD-10-CM

## 2021-06-05 DIAGNOSIS — R11.0 NAUSEA: ICD-10-CM

## 2021-06-05 DIAGNOSIS — Z85.828 PERSONAL HISTORY OF OTHER MALIGNANT NEOPLASM OF SKIN: ICD-10-CM

## 2021-06-05 LAB
ALBUMIN SERPL ELPH-MCNC: 4.7 G/DL — SIGNIFICANT CHANGE UP (ref 3.5–5.2)
ALP SERPL-CCNC: 80 U/L — SIGNIFICANT CHANGE UP (ref 30–115)
ALT FLD-CCNC: 21 U/L — SIGNIFICANT CHANGE UP (ref 0–41)
ANION GAP SERPL CALC-SCNC: 10 MMOL/L — SIGNIFICANT CHANGE UP (ref 7–14)
AST SERPL-CCNC: 27 U/L — SIGNIFICANT CHANGE UP (ref 0–41)
BASOPHILS # BLD AUTO: 0.06 K/UL — SIGNIFICANT CHANGE UP (ref 0–0.2)
BASOPHILS NFR BLD AUTO: 1 % — SIGNIFICANT CHANGE UP (ref 0–1)
BILIRUB SERPL-MCNC: 0.7 MG/DL — SIGNIFICANT CHANGE UP (ref 0.2–1.2)
BUN SERPL-MCNC: 19 MG/DL — SIGNIFICANT CHANGE UP (ref 10–20)
CALCIUM SERPL-MCNC: 10.9 MG/DL — HIGH (ref 8.5–10.1)
CHLORIDE SERPL-SCNC: 106 MMOL/L — SIGNIFICANT CHANGE UP (ref 98–110)
CO2 SERPL-SCNC: 25 MMOL/L — SIGNIFICANT CHANGE UP (ref 17–32)
CREAT SERPL-MCNC: 0.9 MG/DL — SIGNIFICANT CHANGE UP (ref 0.7–1.5)
EOSINOPHIL # BLD AUTO: 0.18 K/UL — SIGNIFICANT CHANGE UP (ref 0–0.7)
EOSINOPHIL NFR BLD AUTO: 2.9 % — SIGNIFICANT CHANGE UP (ref 0–8)
ERYTHROCYTE [SEDIMENTATION RATE] IN BLOOD: 2 MM/HR — SIGNIFICANT CHANGE UP (ref 0–20)
GLUCOSE SERPL-MCNC: 100 MG/DL — HIGH (ref 70–99)
HCT VFR BLD CALC: 44.7 % — SIGNIFICANT CHANGE UP (ref 37–47)
HGB BLD-MCNC: 15.1 G/DL — SIGNIFICANT CHANGE UP (ref 12–16)
IMM GRANULOCYTES NFR BLD AUTO: 0.2 % — SIGNIFICANT CHANGE UP (ref 0.1–0.3)
LYMPHOCYTES # BLD AUTO: 1.99 K/UL — SIGNIFICANT CHANGE UP (ref 1.2–3.4)
LYMPHOCYTES # BLD AUTO: 32 % — SIGNIFICANT CHANGE UP (ref 20.5–51.1)
MAGNESIUM SERPL-MCNC: 2 MG/DL — SIGNIFICANT CHANGE UP (ref 1.8–2.4)
MCHC RBC-ENTMCNC: 31.2 PG — HIGH (ref 27–31)
MCHC RBC-ENTMCNC: 33.8 G/DL — SIGNIFICANT CHANGE UP (ref 32–37)
MCV RBC AUTO: 92.4 FL — SIGNIFICANT CHANGE UP (ref 81–99)
MONOCYTES # BLD AUTO: 0.58 K/UL — SIGNIFICANT CHANGE UP (ref 0.1–0.6)
MONOCYTES NFR BLD AUTO: 9.3 % — SIGNIFICANT CHANGE UP (ref 1.7–9.3)
NEUTROPHILS # BLD AUTO: 3.4 K/UL — SIGNIFICANT CHANGE UP (ref 1.4–6.5)
NEUTROPHILS NFR BLD AUTO: 54.6 % — SIGNIFICANT CHANGE UP (ref 42.2–75.2)
NRBC # BLD: 0 /100 WBCS — SIGNIFICANT CHANGE UP (ref 0–0)
PLATELET # BLD AUTO: 195 K/UL — SIGNIFICANT CHANGE UP (ref 130–400)
POTASSIUM SERPL-MCNC: 5.4 MMOL/L — HIGH (ref 3.5–5)
POTASSIUM SERPL-SCNC: 5.4 MMOL/L — HIGH (ref 3.5–5)
PROT SERPL-MCNC: 7.2 G/DL — SIGNIFICANT CHANGE UP (ref 6–8)
RBC # BLD: 4.84 M/UL — SIGNIFICANT CHANGE UP (ref 4.2–5.4)
RBC # FLD: 12.4 % — SIGNIFICANT CHANGE UP (ref 11.5–14.5)
SODIUM SERPL-SCNC: 141 MMOL/L — SIGNIFICANT CHANGE UP (ref 135–146)
TROPONIN T SERPL-MCNC: <0.01 NG/ML — SIGNIFICANT CHANGE UP
WBC # BLD: 6.22 K/UL — SIGNIFICANT CHANGE UP (ref 4.8–10.8)
WBC # FLD AUTO: 6.22 K/UL — SIGNIFICANT CHANGE UP (ref 4.8–10.8)

## 2021-06-05 PROCEDURE — 99285 EMERGENCY DEPT VISIT HI MDM: CPT | Mod: GC

## 2021-06-05 PROCEDURE — 99283 EMERGENCY DEPT VISIT LOW MDM: CPT

## 2021-06-05 PROCEDURE — 93010 ELECTROCARDIOGRAM REPORT: CPT

## 2021-06-05 RX ORDER — SODIUM CHLORIDE 9 MG/ML
1000 INJECTION INTRAMUSCULAR; INTRAVENOUS; SUBCUTANEOUS ONCE
Refills: 0 | Status: COMPLETED | OUTPATIENT
Start: 2021-06-05 | End: 2021-06-05

## 2021-06-05 RX ADMIN — SODIUM CHLORIDE 1000 MILLILITER(S): 9 INJECTION INTRAMUSCULAR; INTRAVENOUS; SUBCUTANEOUS at 23:28

## 2021-06-05 NOTE — ED PROVIDER NOTE - OBJECTIVE STATEMENT
Pt is a 71F with a hx of CAD presenting with Pt is a 71F with a hx of CAD, remote hx of colon cancer, current basal cell cancer presenting with vertigo. Pt states yesterday early afternoon she was sitting on couch with friend when she felt sudden onset of vertigo, as if she was being pulled to the left. Pt had some associated nausea but no emesis, no blurry vision, no LOC, no HA at the time, no focal weakness or numbness. She laid down for 10min then the feeling self resolved. This morning pt had small amount of nausea and HA both of which have resolved by time of her arrival to ED.

## 2021-06-05 NOTE — ED ADULT TRIAGE NOTE - CHIEF COMPLAINT QUOTE
Yesterday I was talking to my girlfriend and I felt a weird sensation in my head and then I kept falling over to my left side, my speech was okay, I didn't have any weakness anywhere. It last about 10 minutes and then I was okay. I talked to my doctor today and he said I should come in to see if I had a mini stroke - patient  Patient reports symptoms

## 2021-06-05 NOTE — ED PROVIDER NOTE - PHYSICAL EXAMINATION
CONSTITUTIONAL: Well-developed; well-nourished; in no acute distress.   SKIN: + two skin lesions post basal cell carcinoma biopsy on face, well-healing. skin warm, dry  HEAD: Normocephalic; atraumatic.  EYES: PERRL, EOMI, normal sclera and conjunctiva   ENT: No nasal discharge; airway clear.  NECK: Supple; non tender.  CARD: S1, S2 normal; no murmurs, gallops, or rubs. Regular rate and rhythm.   RESP: No wheezes, rales or rhonchi.  ABD: soft ntnd  EXT: Normal ROM.  No clubbing, cyanosis or edema.   LYMPH: No acute cervical adenopathy.  NEURO: + slight unsteadiness on feet w/ eyes closed but negative romberg sign. CN2-12 intact, no nystagmus, finger-nose coordination intact, gait nl, sensation intact. Alert, oriented, grossly unremarkable  PSYCH: Cooperative, appropriate.

## 2021-06-05 NOTE — ED ADULT NURSE NOTE - OBJECTIVE STATEMENT
patient complaints she was sent in by PCP to r/o TIA. Patient reports that yesterday she had a "weird sensation in her head, kept leaning to her left side."  Patient does not have slurred speech. A&Ox4.  Patient denies any n/v, blurry vision, chest pain, SOB.

## 2021-06-05 NOTE — ED PROVIDER NOTE - ATTENDING CONTRIBUTION TO CARE
Patient presents to ED for evaluation of an episodes of dizziness associated with  ?Lt sided weakness, which had resolved. Patient is c/o mild headache and nausea only. Denies trauma, denies eye pain/vision changes. Denies falls/injuries.   Vitals reviewed.   ADDIE/EOMI, no nystagmus,   B/L temporal arteries are strongly palpable and are non-tender.   supple neck,   lungs: CTA  CNS: awake, alert, o x 3, no focal neurologic deficits.   A/P: Dizziness with ? Lt sided weakness, self resolved,   labs, imaging, reevaluation.

## 2021-06-05 NOTE — ED PROVIDER NOTE - PROGRESS NOTE DETAILS
TD: Discussed case w/ Dr. Dill who recommends CTA head and neck and obs for MRI tomorrow if CT noncont negative.

## 2021-06-06 VITALS
DIASTOLIC BLOOD PRESSURE: 70 MMHG | RESPIRATION RATE: 18 BRPM | OXYGEN SATURATION: 98 % | TEMPERATURE: 97 F | SYSTOLIC BLOOD PRESSURE: 133 MMHG | HEART RATE: 72 BPM

## 2021-06-06 LAB — SARS-COV-2 RNA SPEC QL NAA+PROBE: SIGNIFICANT CHANGE UP

## 2021-06-06 PROCEDURE — 70450 CT HEAD/BRAIN W/O DYE: CPT | Mod: 26,MA

## 2021-06-06 PROCEDURE — 99234 HOSP IP/OBS SM DT SF/LOW 45: CPT

## 2021-06-06 PROCEDURE — 70498 CT ANGIOGRAPHY NECK: CPT | Mod: 26,MA

## 2021-06-06 PROCEDURE — 70551 MRI BRAIN STEM W/O DYE: CPT | Mod: 26,MA

## 2021-06-06 PROCEDURE — 70496 CT ANGIOGRAPHY HEAD: CPT | Mod: 26,MA

## 2021-06-06 RX ORDER — BACITRACIN ZINC 500 UNIT/G
1 OINTMENT IN PACKET (EA) TOPICAL
Refills: 0 | Status: DISCONTINUED | OUTPATIENT
Start: 2021-06-06 | End: 2021-06-06

## 2021-06-06 RX ORDER — ACETAMINOPHEN 500 MG
650 TABLET ORAL ONCE
Refills: 0 | Status: COMPLETED | OUTPATIENT
Start: 2021-06-06 | End: 2021-06-06

## 2021-06-06 RX ORDER — CIPROFLOXACIN HCL 0.3 %
1 DROPS OPHTHALMIC (EYE)
Refills: 0 | Status: DISCONTINUED | OUTPATIENT
Start: 2021-06-06 | End: 2021-06-06

## 2021-06-06 RX ADMIN — Medication 1 DROP(S): at 08:27

## 2021-06-06 RX ADMIN — Medication 650 MILLIGRAM(S): at 08:17

## 2021-06-06 RX ADMIN — Medication 650 MILLIGRAM(S): at 13:36

## 2021-06-06 NOTE — ED CDU PROVIDER INITIAL DAY NOTE - ATTENDING CONTRIBUTION TO CARE
Pt has a history of CAD, s\p Mi 10 years ago. Now presents with a severe episode of spinning sensation. No assoc nausea. This lasted several minutes. Pt states she felt like she was leaning to her left. Now is feeling better. This am has headache and irritation to the right eye. On exam clear speech, S1S2 rrr, lungs clear, abdomen is soft nontender, ext neg for edema. Neuro intact.

## 2021-06-06 NOTE — ED CDU PROVIDER INITIAL DAY NOTE - MEDICAL DECISION MAKING DETAILS
Acute vertigo that was transient. Pt has a history of CAD. Placed into observation for possible TIA. MRI is pending.

## 2021-06-06 NOTE — CONSULT NOTE ADULT - ASSESSMENT
Assessment:     Pt is a 71F presenting after patient was home with a friend and "out of nowhere, felt as I was being pulled to the left." The episode self resolved after laying down for ten minutes. Pt denies any further similar episodes. Pt denies any current dizziness or difficulties with balance. Pt states she has frequent episodes of tinnitus. CT Head, CTA head and neck are negative for infarct. MRI Head completed.     Peripheral Dizziness vs TIA     Plan:    -F/u MRI Head official read  -Patient advised to f/u for enlarged thyroid noted on imaging outpatient     Yumiko Stubbs NP   ex [id number]  Assessment:     Pt is a 71F presenting after patient was home with a friend and "out of nowhere, felt as I was being pulled to the left." The episode self resolved after laying down for ten minutes. Pt denies any further similar episodes. Pt denies any current dizziness or difficulties with balance. Pt states she has frequent episodes of tinnitus. CT Head, CTA head and neck are negative for infarct. MRI Head completed.     Most likely Peripheral Dizziness     Plan:    -F/u with outpatient neurology in 1 month   -Patient advised to f/u for enlarged thyroid noted on imaging outpatient     Yumiko Stubbs NP   ex [id number]  Assessment:     Pt is a 71F presenting after patient was home with a friend and "out of nowhere, felt as I was being pulled to the left." The episode self resolved after laying down for ten minutes. Pt denies any further similar episodes. Pt denies any current dizziness or difficulties with balance. Pt states she has frequent episodes of tinnitus worsening for the last 2-3 months. CT Head, CTA head and neck are negative for infarct. MRI Head completed.     Most likely Peripheral Dizziness     Plan:    -F/u with outpatient neurology in 1 month   -Patient advised to f/u for enlarged thyroid noted on imaging outpatient     Yumiko Stubbs NP   ex [phone]

## 2021-06-06 NOTE — ED ADULT NURSE REASSESSMENT NOTE - NS ED NURSE REASSESS COMMENT FT1
Pt. assessed. Pt. alert and responsive to tactile and verbal stimuli, oriented to person time place and situation. Pt. rested well throughout shift in no apparent distress. Pt. denies any dizziness or lightheadedness at this time. Pt. scheduled for MRI this am. Plan of care discussed with patient, where understanding was voiced. Cardiac and vital sign monitoring ongoing. Safety maintained. Will continue to monitor.

## 2021-06-06 NOTE — ED ADULT NURSE REASSESSMENT NOTE - NS ED NURSE REASSESS COMMENT FT1
Pt reassessed A/O times 4 on cardiac monitor  back from MRI denies no chest pain no SOB no N/V no dizziness no neuro deficits assistance provide safety precaution on progress ,speech clear ambulate with assistance on going nursing observation.

## 2021-06-06 NOTE — ED CDU PROVIDER DISPOSITION NOTE - CLINICAL COURSE
Pt presented with sudden onset of spinning sensation and unable to keep balance. Placed into observation for evaluation. All symptoms resolved after presentation. MRI neg. Ct showed goiter. Labs elevated calcium. All this reviewed with pt. PT advised to follow up pmd, endocrinology and neuro in the next week. Also had irritation to the right eye. Ciprofloxacin drops given to use every 6 hours.

## 2021-06-06 NOTE — ED CDU PROVIDER INITIAL DAY NOTE - OBJECTIVE STATEMENT
72 y/o female with a PMH of MI about 10 years ago, breast ca, skin ca basal cell, GERD, and hx of bleeding stomach ulcers presents to the ED for evaluation of "feeling off balance," and leaning towards the left. pt reports on friday mid afternoon she was with her friend and suddenly felt a weird sensation in her head while sitting down at the table talking and then she unintentionally kept leaning her body towards the left. pt reports she told her family about what went on earlier today, she called her cardiologist dr. mcdaniel who advised her to visit the ed. pt reports this resolved after what felt like ten minutes. pt denies use of blood thinners, headache, visual changes, numbness, tingling, weakness, urinary or bowel incontinence or retention, recent head trauma, n/v/d/c, chest pain, sob, slurred speech, or visual changes. pending MRI brain.

## 2021-06-06 NOTE — ED CDU PROVIDER INITIAL DAY NOTE - PHYSICAL EXAMINATION
Physical Exam    Vital Signs: I have reviewed the initial vital signs.  Constitutional: well-nourished, appears stated age, no acute distress  Eyes: Conjunctiva pink, Sclera clear, PERRLA, EOMI without pain.     Cardiovascular: S1 and S2, regular rate, regular rhythm, well-perfused extremities, radial pulses equal and 2+ b/l.   Respiratory: unlabored respiratory effort, clear to auscultation bilaterally no wheezing, rales and rhonchi. pt is speaking full sentences. no accessory muscle use.   Gastrointestinal: soft, non-tender, nondistended abdomen, no pulsatile mass, normal bowl sounds, no rebound, no guarding, no organomegaly.   Musculoskeletal: supple neck, no lower extremity edema, no calf tenderness, no midline tenderness, no palpable spinal step offs  Integumentary: warm, dry, no rash. (+) healing scabs on face due to recent dermatologic procedure due to skin ca   Neurologic: awake, alert, cranial nerves II-XII grossly intact, extremities’ motor and sensory functions grossly intact. finger to nose intact. negative pronator drift. negative romberg. steady gait. 5/5 strength throughout  Psychiatric: appropriate mood, appropriate affect

## 2021-06-06 NOTE — ED ADULT NURSE REASSESSMENT NOTE - NS ED NURSE REASSESS COMMENT FT1
Pt reassessed remain stable on cardiac monitor denies no pain, no SOB , no N/V ambulate with assistance lunch meal provide did eat 75% assistance provide safety precaution on progress on going nursing observation .Pt is seen evaluate by  neurology and ED attending waiting for discharge .

## 2021-06-06 NOTE — ED CDU PROVIDER DISPOSITION NOTE - PATIENT PORTAL LINK FT
You can access the FollowMyHealth Patient Portal offered by [facility] by registering at the following website: [email or web address]. By joining [facility]’s FollowMyHealth portal, you will also be able to view your health information using other applications (apps) compatible with our system.

## 2021-06-06 NOTE — ED CDU PROVIDER INITIAL DAY NOTE - NS ED ROS FT
CONST: No fever, chills or bodyaches  EYES: No pain, redness, drainage or visual changes.  ENT: No ear pain or discharge, nasal discharge or congestion. No sore throat  CARD: No chest pain, palpitations  RESP: No SOB, cough, hemoptysis. No hx of asthma or COPD  GI: No abdominal pain, N/V/D  : No urinary symptoms  MS: No joint pain, back pain or extremity pain/injury  SKIN: No rashes  NEURO: No headache, dizziness, paresthesias or LOC. (+) feeling off balance, leaning toward left.

## 2021-06-06 NOTE — ED CDU PROVIDER DISPOSITION NOTE - NSFOLLOWUPCLINICS_GEN_ALL_ED_FT
[facility]  Neurology  [address] [place] [address], [address]  [place] [address]  Phone: [phone]  Fax:

## 2021-06-06 NOTE — ED ADULT NURSE REASSESSMENT NOTE - NS ED NURSE REASSESS COMMENT FT1
patient placed in observation and moved to 13B.  Report given to NELDA Guadarrama.  Patient in stable condition and nad.

## 2021-06-06 NOTE — ED CDU PROVIDER DISPOSITION NOTE - CARE PROVIDER_API CALL
Roxi Ware  INTERNAL MEDICINE  [address] [place] [address]  Phone: [phone]  Fax: [phone]  Follow Up Time:

## 2021-06-06 NOTE — ED CDU PROVIDER INITIAL DAY NOTE - PROGRESS NOTE DETAILS
Pt stable, seen resting comfortably at bedside. C/o mild HA and rt eye irritation/redness. Sts was rubbing eye last night. Will give cipro drops. Pending MRI brain Discussed results with patient including enlarged thyroid gland. Pt instructed to follow up with raj bacon. Pt given copy of all results.

## 2021-06-06 NOTE — CONSULT NOTE ADULT - ATTENDING COMMENTS
Patient seen and examined and agree with above except as noted.  Patients history, notes, labs, imaging, vitals and meds reviewed personally.  Patient had episode of pulling feeling which lasted a few minutes.  She has been having tinnitus which has been worsening for last 2 months.  Exam and clinical history more suggestive of peripheral vestibular dysfunction.    plan as above

## 2021-06-06 NOTE — CONSULT NOTE ADULT - SUBJECTIVE AND OBJECTIVE BOX
Neurology Consult Note     HPI:    Pt is a 71F with a hx of CAD, remote hx of colon cancer, current basal cell cancer presenting with vertigo. Pt states yesterday early afternoon she was sitting on couch with friend when she felt sudden onset of vertigo, as if she was being pulled to the left. Pt had some associated nausea but no emesis, no blurry vision, no LOC, no HA at the time, no focal weakness or numbness. She laid down for 10 min then the feeling self resolved. This morning pt had small amount of nausea and HA both of which have resolved by time of her arrival to ED.    PAST MEDICAL & SURGICAL HISTORY:    Heart attack    Skin cancer    Coronary artery disease    Breast cancer    History of acute myocardial infarction    Acid reflux    Acute knee pain  pain and swelling right knee    History of cholecystectomy    History of tonsillectomy    H/O lumpectomy    History of bowel resection    History of surgery  jaw surgery      Home Medications:  famotidine 40 mg oral tablet: 1 tab(s) orally once a day (at bedtime) (11 Jul 2019 16:33)  omeprazole 40 mg oral delayed release capsule: 1 cap(s) orally once a day (11 Jul 2019 16:33)  pantoprazole 40 mg oral delayed release tablet: 1 tab(s) orally once a day (11 Jul 2019 16:33)    Current Medications:   ciprofloxacin  0.3% Ophthalmic Solution 1 Drop(s) Right EYE four times a day      Vital Signs:   T(F): 97.1 (06-06-21 @ 06:23), Max: 99 (06-05-21 @ 19:48)  HR: 75 (06-06-21 @ 06:23) (75 - 80)  BP: 138/71 (06-06-21 @ 06:23) (135/77 - 138/71)  RR: 18 (06-06-21 @ 06:23) (18 - 18)  SpO2: 98% (06-06-21 @ 06:23) (97% - 98%)                        15.1   6.22  )-----------( 195      ( 05 Jun 2021 21:19 )             44.7     06-05    141  |  106  |  19  ----------------------------<  100<H>  5.4<H>   |  25  |  0.9    Ca    10.9<H>      05 Jun 2021 21:19  Mg     2.0     06-05    TPro  7.2  /  Alb  4.7  /  TBili  0.7  /  DBili  x   /  AST  27  /  ALT  21  /  AlkPhos  80  06-05        LIVER FUNCTIONS - ( 05 Jun 2021 21:19 )  Alb: 4.7 g/dL / Pro: 7.2 g/dL / ALK PHOS: 80 U/L / ALT: 21 U/L / AST: 27 U/L / GGT: x           CARDIAC MARKERS ( 05 Jun 2021 21:19 )  x     / <0.01 ng/mL / x     / x     / x              Neuro Exam:    Neurological:  Mental Status: Alert and Oriented to person, place, and time, cooperative, pleasant. Affect appropriate, thought, speech, and language coherent. Pt able to states current president.   Cranial Nerves:  II, III, IV, VI: PERRLA, EOM intact. Visual Fields Intact. No cranial nerve palsy or nystagmus noted.  V: facial sensation intact  VII: face symmetrical at rest   XII: no dysarthria  Motor: Normal muscle bulk/tone. Good posture. Strength 5+/5+ upper & lower extremities  Cerebellar Function: + Romberg. Normal rapid alternating movements        Last CTH:    CT Head No Cont (06.06.21 @ 00:21)     IMPRESSION:    No evidence of acute intracranial pathology.        Last CTA/MRA:    CT Angio Head w/ IV Cont (06.06.21 @ 00:26)     IMPRESSION:    No evidence of major vascular stenosis or occlusion.    Enlarged heterogeneous thyroid gland, right greater than left, likely represent multinodular goiter.        Last MRI:   MRI Head completed, pending official read

## 2021-06-10 ENCOUNTER — APPOINTMENT (OUTPATIENT)
Dept: CARDIOLOGY | Facility: CLINIC | Age: 71
End: 2021-06-10
Payer: MEDICARE

## 2021-06-10 VITALS — HEIGHT: 64 IN | WEIGHT: 200 LBS | BODY MASS INDEX: 34.15 KG/M2

## 2021-06-10 VITALS — DIASTOLIC BLOOD PRESSURE: 80 MMHG | SYSTOLIC BLOOD PRESSURE: 130 MMHG

## 2021-06-10 VITALS — RESPIRATION RATE: 18 BRPM | HEART RATE: 80 BPM

## 2021-06-10 DIAGNOSIS — K27.9 PEPTIC ULCER, SITE UNSPECIFIED, UNSPECIFIED AS ACUTE OR CHRONIC, W/OUT HEMORRHAGE OR PERFORATION: ICD-10-CM

## 2021-06-10 PROCEDURE — 93000 ELECTROCARDIOGRAM COMPLETE: CPT

## 2021-06-10 PROCEDURE — 99214 OFFICE O/P EST MOD 30 MIN: CPT

## 2021-06-10 RX ORDER — IBUPROFEN 200 MG
600 CAPSULE ORAL
Refills: 0 | Status: ACTIVE | COMMUNITY

## 2021-06-10 RX ORDER — CHOLECALCIFEROL (VITAMIN D3) 1250 MCG
1.25 MG CAPSULE ORAL
Refills: 0 | Status: ACTIVE | COMMUNITY

## 2021-06-24 ENCOUNTER — APPOINTMENT (OUTPATIENT)
Dept: NEUROLOGY | Facility: CLINIC | Age: 71
End: 2021-06-24
Payer: MEDICARE

## 2021-06-24 VITALS
HEIGHT: 68 IN | OXYGEN SATURATION: 98 % | WEIGHT: 200 LBS | DIASTOLIC BLOOD PRESSURE: 80 MMHG | BODY MASS INDEX: 30.31 KG/M2 | SYSTOLIC BLOOD PRESSURE: 132 MMHG | TEMPERATURE: 97.6 F | HEART RATE: 72 BPM

## 2021-06-24 PROCEDURE — 99214 OFFICE O/P EST MOD 30 MIN: CPT

## 2021-06-24 NOTE — DISCUSSION/SUMMARY
[FreeTextEntry1] : Patient is 71F with a PMHx of CAD, remote hx of colon cancer, current basal cell carcinoma, hx of Takotsubo and MI who presents to clinic for HFU for vertigo during ED visit on 6/5/2021.  Patient was sitting on the couch with a friend talking when she felt sudden onset of vertigo, as if she was being pulled to the left. Pt had some associated nausea. She sat down for a while and then the feeling self-resolved. She was seen by Dr. Argueta in the hospital, that thought to have peripheral vertigo. MRI brain was negative for pathology. CTA H/N was negative for major stenoses or occlusion.  She was found to have multinodular goiter.  DDx consists of seizure vs tia vs vestibular issues vs hypothyroidism. She also admits to anxiety.  At this time, patient declined vestibular therapy as all symptoms have resolved. Exam was unremarkable, FTN with eyes closed negative for deviation, Romberg negative, heel-toe walking intact, no imbalance when pushed. \par \par PLAN: \par -Referral to endocrinologist   \par -Thyroid panel \par -Lipid panel and A1c \par -REEG \par -F/u in clinic in 4 months

## 2021-06-24 NOTE — HISTORY OF PRESENT ILLNESS
[FreeTextEntry1] : Patient is 71F with a PMHx of CAD, remote hx of colon cancer, current basal cell carcinoma, hx of Takotsubo and MI who presents to clinic for HFU for vertigo during ED visit on 6/5/2021.  Patient was sitting on the couch with a friend talkikng when she felt sudden onset of vertigo, as if she was being pulled to the left. Pt had some associated nausea but no emesis, no blurry vision, no LOC, no HA at the time, no focal weakness or numbness. She sat down for a while and then the feeling self-resolved. The following morning, she had small amount of nausea and HA both of which have resolved by time of her arrival to ED. She was seen by Dr. Argueta in the hospital, that thought to have peripheral vertigo. MRI brain was negative for pathology. CTA H/N was negative for major stenoses or occlusion.  She had enlarged heterogenous thyroid gland, right great than left.\par \par Patient presents to clinic today and denies any neurological complaints. She has had no more episodes. BP is 132/80. Patient states that her sister has vertigo. Years ago in highs school she has had history of opthalmic migraines and blacking out/n/v where she would have pain in her right eye. She was diagnosed with vertigo years ago. She went to the ENT prior to ED admission and cleared for hearing aids after referral by PMD for [place]. She said there was no pathology of her ears. Her main complaint about her vertigo episode was feeling like she was pulled to the side; even when she sat up she still felt pulled.  She does c/o a lot of fatigue. \par \par

## 2021-06-24 NOTE — PHYSICAL EXAM
[FreeTextEntry1] : Focal neurological exam:\par \par MS: Awake, alert, oriented to person, place, situation and time. Normal affect. Follows commands. \par \par Language: Speech is clear, fluent with good repetition & comprehension. No dysarthria. \par \par CNs 2 - 12 intact. EOMI no nystagmus, no diplopia. No facial asymmetry b/l, full eye closure strength b/l. Hearing grossly normal. Head turning & shoulder shrug intact b/l. Tongue midline, normal movements, no atrophy.\par \par Motor: Normal muscle bulk & tone. No noticeable tremor or seizure. No pronator drift. Muscle strength of b/l UE and b/l LE 5/5. \par \par Reflexes: DTR of biceps 2+, knees 2+ \par \par Sensation: Intact to LT and vibration b/l throughout\par \par Cortical: No extinction\par \par Coordination: No dysmetria to FTN. No deviation FTN with eyes closed. Negative Romberg. Heel-toe walking intact. No imbalance in response to force. \par \par Gait: No postural instability. Normal stance and tandem gait.\par \par \par \par \par

## 2021-07-07 ENCOUNTER — APPOINTMENT (OUTPATIENT)
Dept: NEUROLOGY | Facility: CLINIC | Age: 71
End: 2021-07-07
Payer: MEDICARE

## 2021-07-07 PROCEDURE — 95816 EEG AWAKE AND DROWSY: CPT

## 2021-07-21 ENCOUNTER — APPOINTMENT (OUTPATIENT)
Age: 71
End: 2021-07-21

## 2021-08-18 ENCOUNTER — APPOINTMENT (OUTPATIENT)
Dept: PLASTIC SURGERY | Facility: CLINIC | Age: 71
End: 2021-08-18
Payer: MEDICARE

## 2021-08-18 VITALS — HEIGHT: 67 IN | WEIGHT: 200 LBS | BODY MASS INDEX: 31.39 KG/M2

## 2021-08-18 PROCEDURE — 99203 OFFICE O/P NEW LOW 30 MIN: CPT

## 2021-08-18 NOTE — PHYSICAL EXAM
[de-identified] : well developed pleasant female, NAD [de-identified] : NC/AT; right lower forehead/brow transverse Mohs closure 4 cm\par left forehead height ~6-7 cm \par well-healed left pre-auricular scar [de-identified] : PRETTY RANGEL [de-identified] : supple [de-identified] : unlabored breathing, good inspiratory effort  [de-identified] : BRIGIDR [de-identified] : soft, nontender  [de-identified] : BL medial arms with excess skin laxity [de-identified] : Nose - a substantial full thickness Moh's defect of nasal dorsum measuring approximately 3.5 x 3.2 cm, thin upper nasal skin envelope

## 2021-08-18 NOTE — DATA REVIEWED
[FreeTextEntry1] : 6/3/21 Pathology  - 1. Rt lower forehead - small Squamous cell carcinoma, 2. upper nasal dorsum - Basal cell carcinoma, superficial

## 2021-08-18 NOTE — ASSESSMENT
[FreeTextEntry1] : 72 yo F with substantial Moh's defect requiring soft tissue reconstruction.     \par \par -Recommend FTSG (right medial arm) to open nasal Mohs wound\par -Discussed treatment options: FTSG vs forehead flap.\par .-I reviewed her reconstructive options and the benefits, risks and outcomes of each, which include forehead flap vs composite reconstruction of the mutli-subunit open nasal wound with subunit full-thickness skin grafting\par -Regarding forehead flap, she understands that she has a short forehead height which will require extension of the donor site into hair-bearing scalp for needed soft tissue resurfacing of her entire nose. This will result in hair-bearing tissue on her nose and will require post-operative laser hair removal/shaving. The forehead flap is a staged procedure: flap creation, followed by revision procedures, then division and inset, and possible contouring procedure in the future. Donor site scar and extent of donor tissue harvesting was demonstrated with mirror.  Discussion of pedicled tissue and post-op wound care reviewed. \par -Regarding subunit FTSG open nasal wound resurfacing, she understand the risk of bleeding, infection, seroma, hematoma, scarring, graft failure, graft contracture, color mismatch, contour deformity. Due to the extent of nasal wound, she may require FTSG from multiple sites. Donor site right pre-auricular skin, supraclavular skin, and/or upper medial arm. I recommended to the patient that she is a better candidate for subunit FTSG reconstruction. I reviewed that makeup application may camouflage the possible color mismatch.\par -I discussed the risk of reconstruction, which include but not limited to bleeding, infection, hematoma, seroma, graft failure, poor wound healing and scarring, possible keloid,contour deformity, asymmetry, disease recurrence, and need for re-operation, and/or revisional surgeries in the future, and dissatisfaction with outcome.\par -Patient desires the simplest and least number of surgeries.  I recommended FTSG for nasal wound resurfacing.  Pt agreed and all questions were answered to her satisfaction.  INformed consent was obtained.\par \par Photos were taken\par \par \par Due to COVID-19, pre-visit patient instructions were explained to the patient and their symptoms were checked upon arrival. Masks were used by the healthcare provider and staff and the examination room was cleaned after the patient visit concluded\par

## 2021-08-18 NOTE — HISTORY OF PRESENT ILLNESS
[FreeTextEntry1] : 70 yo LHD F with PMHx of MI (10 yrs ago), GERD, Barretts esophagus, Breast Ca s/p left lumpectomy, no RT, Colon Ca s/p partial colon resection, TOMER and prior h/o facial BCC who presents today for evaluation of new nasal Moh's defect. Pt reports a small nasal and right forehead lesion present for over 1 year with delay in treatment due to pandemic. Office biopsy by Dr. 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 Forehead SCC was excised 2-3 weeks ago by Dr. Narayan in the office and does not require further excision. Patient underwent 4-stage Moh's procedure yesterday with Dr. Narayan. \par She presents today to discuss reconstructive options for nasal Moh's defect. \par \par Occupation - retired  \par Former smoker, quit 30 years ago\par \par Denies any h/o DVTs or MRSA infections. \par No ASA tx

## 2021-08-20 ENCOUNTER — OUTPATIENT (OUTPATIENT)
Dept: OUTPATIENT SERVICES | Facility: HOSPITAL | Age: 71
LOS: 1 days | Discharge: HOME | End: 2021-08-20
Payer: MEDICARE

## 2021-08-20 ENCOUNTER — RESULT REVIEW (OUTPATIENT)
Age: 71
End: 2021-08-20

## 2021-08-20 VITALS
DIASTOLIC BLOOD PRESSURE: 76 MMHG | SYSTOLIC BLOOD PRESSURE: 136 MMHG | OXYGEN SATURATION: 97 % | TEMPERATURE: 98 F | HEART RATE: 76 BPM | HEIGHT: 67 IN | WEIGHT: 199.96 LBS | RESPIRATION RATE: 16 BRPM

## 2021-08-20 DIAGNOSIS — Z98.890 OTHER SPECIFIED POSTPROCEDURAL STATES: Chronic | ICD-10-CM

## 2021-08-20 DIAGNOSIS — C44.311 BASAL CELL CARCINOMA OF SKIN OF NOSE: ICD-10-CM

## 2021-08-20 DIAGNOSIS — S01.20XD UNSPECIFIED OPEN WOUND OF NOSE, SUBSEQUENT ENCOUNTER: ICD-10-CM

## 2021-08-20 DIAGNOSIS — Z01.818 ENCOUNTER FOR OTHER PREPROCEDURAL EXAMINATION: ICD-10-CM

## 2021-08-20 LAB
ALBUMIN SERPL ELPH-MCNC: 4.9 G/DL — SIGNIFICANT CHANGE UP (ref 3.5–5.2)
ALP SERPL-CCNC: 85 U/L — SIGNIFICANT CHANGE UP (ref 30–115)
ALT FLD-CCNC: 22 U/L — SIGNIFICANT CHANGE UP (ref 0–41)
ANION GAP SERPL CALC-SCNC: 10 MMOL/L — SIGNIFICANT CHANGE UP (ref 7–14)
APTT BLD: 35.6 SEC — SIGNIFICANT CHANGE UP (ref 27–39.2)
AST SERPL-CCNC: 27 U/L — SIGNIFICANT CHANGE UP (ref 0–41)
BASOPHILS # BLD AUTO: 0.04 K/UL — SIGNIFICANT CHANGE UP (ref 0–0.2)
BASOPHILS NFR BLD AUTO: 0.9 % — SIGNIFICANT CHANGE UP (ref 0–1)
BILIRUB SERPL-MCNC: 1 MG/DL — SIGNIFICANT CHANGE UP (ref 0.2–1.2)
BUN SERPL-MCNC: 16 MG/DL — SIGNIFICANT CHANGE UP (ref 10–20)
CALCIUM SERPL-MCNC: 10.6 MG/DL — HIGH (ref 8.5–10.1)
CHLORIDE SERPL-SCNC: 106 MMOL/L — SIGNIFICANT CHANGE UP (ref 98–110)
CO2 SERPL-SCNC: 26 MMOL/L — SIGNIFICANT CHANGE UP (ref 17–32)
CREAT SERPL-MCNC: 1 MG/DL — SIGNIFICANT CHANGE UP (ref 0.7–1.5)
EOSINOPHIL # BLD AUTO: 0.17 K/UL — SIGNIFICANT CHANGE UP (ref 0–0.7)
EOSINOPHIL NFR BLD AUTO: 3.7 % — SIGNIFICANT CHANGE UP (ref 0–8)
GLUCOSE SERPL-MCNC: 98 MG/DL — SIGNIFICANT CHANGE UP (ref 70–99)
HCT VFR BLD CALC: 44.6 % — SIGNIFICANT CHANGE UP (ref 37–47)
HGB BLD-MCNC: 14.9 G/DL — SIGNIFICANT CHANGE UP (ref 12–16)
IMM GRANULOCYTES NFR BLD AUTO: 0.2 % — SIGNIFICANT CHANGE UP (ref 0.1–0.3)
INR BLD: 0.96 RATIO — SIGNIFICANT CHANGE UP (ref 0.65–1.3)
LYMPHOCYTES # BLD AUTO: 1.38 K/UL — SIGNIFICANT CHANGE UP (ref 1.2–3.4)
LYMPHOCYTES # BLD AUTO: 30.2 % — SIGNIFICANT CHANGE UP (ref 20.5–51.1)
MCHC RBC-ENTMCNC: 31.1 PG — HIGH (ref 27–31)
MCHC RBC-ENTMCNC: 33.4 G/DL — SIGNIFICANT CHANGE UP (ref 32–37)
MCV RBC AUTO: 93.1 FL — SIGNIFICANT CHANGE UP (ref 81–99)
MONOCYTES # BLD AUTO: 0.48 K/UL — SIGNIFICANT CHANGE UP (ref 0.1–0.6)
MONOCYTES NFR BLD AUTO: 10.5 % — HIGH (ref 1.7–9.3)
NEUTROPHILS # BLD AUTO: 2.49 K/UL — SIGNIFICANT CHANGE UP (ref 1.4–6.5)
NEUTROPHILS NFR BLD AUTO: 54.5 % — SIGNIFICANT CHANGE UP (ref 42.2–75.2)
NRBC # BLD: 0 /100 WBCS — SIGNIFICANT CHANGE UP (ref 0–0)
PLATELET # BLD AUTO: 192 K/UL — SIGNIFICANT CHANGE UP (ref 130–400)
POTASSIUM SERPL-MCNC: 4.9 MMOL/L — SIGNIFICANT CHANGE UP (ref 3.5–5)
POTASSIUM SERPL-SCNC: 4.9 MMOL/L — SIGNIFICANT CHANGE UP (ref 3.5–5)
PROT SERPL-MCNC: 7.2 G/DL — SIGNIFICANT CHANGE UP (ref 6–8)
PROTHROM AB SERPL-ACNC: 11.1 SEC — SIGNIFICANT CHANGE UP (ref 9.95–12.87)
RBC # BLD: 4.79 M/UL — SIGNIFICANT CHANGE UP (ref 4.2–5.4)
RBC # FLD: 12.4 % — SIGNIFICANT CHANGE UP (ref 11.5–14.5)
SODIUM SERPL-SCNC: 142 MMOL/L — SIGNIFICANT CHANGE UP (ref 135–146)
WBC # BLD: 4.57 K/UL — LOW (ref 4.8–10.8)
WBC # FLD AUTO: 4.57 K/UL — LOW (ref 4.8–10.8)

## 2021-08-20 PROCEDURE — 93010 ELECTROCARDIOGRAM REPORT: CPT

## 2021-08-20 PROCEDURE — 71046 X-RAY EXAM CHEST 2 VIEWS: CPT | Mod: 26

## 2021-08-20 RX ORDER — PANTOPRAZOLE SODIUM 20 MG/1
1 TABLET, DELAYED RELEASE ORAL
Qty: 0 | Refills: 0 | DISCHARGE

## 2021-08-20 RX ORDER — OMEPRAZOLE 10 MG/1
1 CAPSULE, DELAYED RELEASE ORAL
Qty: 0 | Refills: 0 | DISCHARGE

## 2021-08-20 RX ORDER — FAMOTIDINE 10 MG/ML
1 INJECTION INTRAVENOUS
Qty: 0 | Refills: 0 | DISCHARGE

## 2021-08-20 NOTE — H&P PST ADULT - HISTORY OF PRESENT ILLNESS
71 YR old female states was diagnosed with basal cell cancer of the nose S/P MOHS surgery 8/17/2021 is scheduled for NASAL MOHS WOUND CLOSURE WITH FULL THICKNESS SKIN GRAFT DONOR SITE RIGHT ARM with plastic surgeon.  Denies COVID S/S. Recd 2 doses of vaccine. Verbalized understanding of COVID prevention measures. Exercise kayleigh 2-3 flat blocks.  Anesthesia Alert  NO--Difficult Airway  NO--History of neck surgery or radiation  NO--Limited ROM of neck  NO--History of Malignant hyperthermia  NO--Personal or family history of Pseudocholinesterase deficiency  NO--Prior Anesthesia Complication  NO--Latex Allergy  NO--Loose teeth  NO--History of Rheumatoid Arthritis  NO--TOMER  No Bleeding risk  NO--Other_____   71 YR old female states was diagnosed with basal cell cancer of the nose S/P MOHS surgery 8/17/2021 is scheduled for NASAL MOHS WOUND CLOSURE WITH FULL THICKNESS SKIN GRAFT DONOR SITE RIGHT ARM with plastic surgeon.  Denies COVID S/S. Recd 2 doses of vaccine. Verbalized understanding of COVID prevention measures. Exercise kayleigh 2-3 flat blocks.  Anesthesia Alert  NO--Difficult Airway  NO--History of neck surgery or radiation  NO--Limited ROM of neck  NO--History of Malignant hyperthermia  NO--Personal or family history of Pseudocholinesterase deficiency  NO--Prior Anesthesia Complication  NO--Latex Allergy  NO--Loose teeth  NO--History of Rheumatoid Arthritis  NO--TOMER  No Bleeding risk  YES H/O JAW SURGERY

## 2021-08-20 NOTE — H&P PST ADULT - NSICDXPASTMEDICALHX_GEN_ALL_CORE_FT
PAST MEDICAL HISTORY:  Acid reflux     Acute knee pain pain and swelling right knee    Breast cancer RT-2015    Cancer, colon 2008    Coronary artery disease     Heart attack     History of acute myocardial infarction     Skin cancer     Takotsubo syndrome     Vertigo Admission 6/2021

## 2021-08-20 NOTE — H&P PST ADULT - NSICDXPASTSURGICALHX_GEN_ALL_CORE_FT
PAST SURGICAL HISTORY:  H/O lumpectomy RT    History of bowel resection     History of cholecystectomy     History of surgery jaw surgery    History of tonsillectomy

## 2021-08-23 ENCOUNTER — OUTPATIENT (OUTPATIENT)
Dept: OUTPATIENT SERVICES | Facility: HOSPITAL | Age: 71
LOS: 1 days | Discharge: HOME | End: 2021-08-23

## 2021-08-23 ENCOUNTER — NON-APPOINTMENT (OUTPATIENT)
Age: 71
End: 2021-08-23

## 2021-08-23 ENCOUNTER — LABORATORY RESULT (OUTPATIENT)
Age: 71
End: 2021-08-23

## 2021-08-23 DIAGNOSIS — Z98.890 OTHER SPECIFIED POSTPROCEDURAL STATES: Chronic | ICD-10-CM

## 2021-08-23 DIAGNOSIS — Z11.59 ENCOUNTER FOR SCREENING FOR OTHER VIRAL DISEASES: ICD-10-CM

## 2021-08-23 PROBLEM — C18.9 MALIGNANT NEOPLASM OF COLON, UNSPECIFIED: Chronic | Status: ACTIVE | Noted: 2021-08-20

## 2021-08-23 PROBLEM — I51.81 TAKOTSUBO SYNDROME: Chronic | Status: ACTIVE | Noted: 2021-08-20

## 2021-08-23 PROBLEM — R42 DIZZINESS AND GIDDINESS: Chronic | Status: ACTIVE | Noted: 2021-08-20

## 2021-08-23 PROBLEM — C50.919 MALIGNANT NEOPLASM OF UNSPECIFIED SITE OF UNSPECIFIED FEMALE BREAST: Chronic | Status: ACTIVE | Noted: 2018-03-06

## 2021-08-24 ENCOUNTER — NON-APPOINTMENT (OUTPATIENT)
Age: 71
End: 2021-08-24

## 2021-08-24 DIAGNOSIS — E04.9 NONTOXIC GOITER, UNSPECIFIED: ICD-10-CM

## 2021-08-26 ENCOUNTER — OUTPATIENT (OUTPATIENT)
Dept: OUTPATIENT SERVICES | Facility: HOSPITAL | Age: 71
LOS: 1 days | Discharge: HOME | End: 2021-08-26

## 2021-08-26 ENCOUNTER — APPOINTMENT (OUTPATIENT)
Dept: PLASTIC SURGERY | Facility: AMBULATORY SURGERY CENTER | Age: 71
End: 2021-08-26
Payer: MEDICARE

## 2021-08-26 VITALS
SYSTOLIC BLOOD PRESSURE: 155 MMHG | HEIGHT: 67 IN | WEIGHT: 199.96 LBS | RESPIRATION RATE: 20 BRPM | DIASTOLIC BLOOD PRESSURE: 72 MMHG | HEART RATE: 61 BPM | OXYGEN SATURATION: 100 % | TEMPERATURE: 98 F

## 2021-08-26 VITALS
HEART RATE: 65 BPM | SYSTOLIC BLOOD PRESSURE: 108 MMHG | OXYGEN SATURATION: 100 % | RESPIRATION RATE: 20 BRPM | DIASTOLIC BLOOD PRESSURE: 59 MMHG

## 2021-08-26 DIAGNOSIS — Z98.890 OTHER SPECIFIED POSTPROCEDURAL STATES: Chronic | ICD-10-CM

## 2021-08-26 PROCEDURE — 15261 FTH/GFT FR N/E/E/L EACH ADDL: CPT

## 2021-08-26 PROCEDURE — 15260 FTH/GFT FR N/E/E/L 20 SQCM/<: CPT

## 2021-08-26 PROCEDURE — 15004 WOUND PREP F/N/HF/G: CPT

## 2021-08-26 RX ORDER — SODIUM CHLORIDE 9 MG/ML
1000 INJECTION, SOLUTION INTRAVENOUS
Refills: 0 | Status: DISCONTINUED | OUTPATIENT
Start: 2021-08-26 | End: 2021-09-09

## 2021-08-26 RX ORDER — MORPHINE SULFATE 50 MG/1
2 CAPSULE, EXTENDED RELEASE ORAL
Refills: 0 | Status: DISCONTINUED | OUTPATIENT
Start: 2021-08-26 | End: 2021-08-26

## 2021-08-26 RX ORDER — OXYCODONE AND ACETAMINOPHEN 5; 325 MG/1; MG/1
1 TABLET ORAL ONCE
Refills: 0 | Status: DISCONTINUED | OUTPATIENT
Start: 2021-08-26 | End: 2021-08-26

## 2021-08-26 RX ORDER — HEPARIN SODIUM 5000 [USP'U]/ML
5000 INJECTION INTRAVENOUS; SUBCUTANEOUS ONCE
Refills: 0 | Status: COMPLETED | OUTPATIENT
Start: 2021-08-26 | End: 2021-08-26

## 2021-08-26 RX ORDER — ONDANSETRON 8 MG/1
4 TABLET, FILM COATED ORAL ONCE
Refills: 0 | Status: DISCONTINUED | OUTPATIENT
Start: 2021-08-26 | End: 2021-09-09

## 2021-08-26 RX ORDER — FAMOTIDINE 10 MG/ML
1 INJECTION INTRAVENOUS
Qty: 0 | Refills: 0 | DISCHARGE

## 2021-08-26 RX ORDER — TRAMADOL HYDROCHLORIDE 50 MG/1
1 TABLET ORAL
Qty: 10 | Refills: 0
Start: 2021-08-26

## 2021-08-26 RX ORDER — HYDROMORPHONE HYDROCHLORIDE 2 MG/ML
0.5 INJECTION INTRAMUSCULAR; INTRAVENOUS; SUBCUTANEOUS
Refills: 0 | Status: DISCONTINUED | OUTPATIENT
Start: 2021-08-26 | End: 2021-08-26

## 2021-08-26 RX ADMIN — HEPARIN SODIUM 5000 UNIT(S): 5000 INJECTION INTRAVENOUS; SUBCUTANEOUS at 07:28

## 2021-08-26 RX ADMIN — SODIUM CHLORIDE 75 MILLILITER(S): 9 INJECTION, SOLUTION INTRAVENOUS at 10:19

## 2021-08-26 NOTE — BRIEF OPERATIVE NOTE - NSICDXBRIEFPROCEDURE_GEN_ALL_CORE_FT
PROCEDURES:  Reconstruction, face, post Mohs micrographic surgery 26-Aug-2021 09:49:51 Nasal and right cheek Mohs defect Tamara Vivas  Full thickness free graft of nose including direct closure of donor site 26-Aug-2021 09:50:34 and right cheek, donor site right medial arm Tamara Vivas

## 2021-08-26 NOTE — ASU DISCHARGE PLAN (ADULT/PEDIATRIC) - ASU DC SPECIAL INSTRUCTIONSFT
-Keep dressings clean and dry. Do not remove.  -Take antibiotics for 5 days as prescribed.  -Take Tylenol as needed for pain. If not well controlled, take Tramadol as needed.  -Sleep with the head of the bed elevated using pillows to help prevent swelling.  -No heavy lifting with right arm (nothing over 10 lbs).  -No driving if taking pain medication.  -Bruising, swelling, scant drainage, and numbness are normal and are expected to improve over time.  -Call the office anytime for questions or concerns.  -Follow up in 1 week. -Keep dressings clean and dry. Do not remove.  -Take antibiotics for 5 days as prescribed.  -Take Tylenol 650mg every 6 hours for the next 3 days, then as needed only. If pain is still not well controlled, also take Tramadol as needed.  -Sleep with the head of the bed elevated using pillows to help prevent swelling.  -No heavy lifting with right arm (nothing over 10 lbs).  -No driving if taking pain medication.  -Bruising, swelling, scant drainage, and numbness are normal and are expected to improve over time.  -Call the office anytime for questions or concerns.  -Follow up in 1 week.

## 2021-08-26 NOTE — BRIEF OPERATIVE NOTE - OPERATION/FINDINGS
Nasal and right cheek Mohs defect reconstructed using full thickness skin graft from right medial arm with primary closure of donor site Nasal and right cheek Mohs defect reconstructed using full thickness skin graft from right medial arm with primary closure of donor site  ~4.5 x 5 cm

## 2021-08-26 NOTE — CHART NOTE - NSCHARTNOTEFT_GEN_A_CORE
PACU ANESTHESIA ADMISSION NOTE      Procedure: Reconstruction, face, post Mohs micrographic surgery  Nasal and right cheek Mohs defect    Full thickness free graft of nose including direct closure of donor site  and right cheek, donor site right medial arm      Post op diagnosis:  Mohs defect        ____  Intubated  TV:______       Rate: ______      FiO2: ______    _x___  Patent Airway    _x___  Full return of protective reflexes    _x___  Full recovery from anesthesia / back to baseline status    Vitals:  T 98.9 f  HR: 69  BP: 120/62  RR: 15  SpO2: 96% on FM 8L/min    Mental Status:  _x___ Awake   _____ Alert   __x___ Drowsy   _____ Sedated    Nausea/Vomiting:  _x___  NO       ______Yes,   See Post - Op Orders         Pain Scale (0-10):  __0___    Treatment: _x___ None    ____ See Post - Op/PCA Orders    Post - Operative Fluids:   __x__ Oral   ____ See Post - Op Orders    Plan: Discharge:   _x___Home       _____Floor     _____Critical Care    _____  Other:_________________    Comments: uneventful GETA, VSS, full report to pacu rn  No anesthesia issues or complications noted.  Discharge when criteria met.

## 2021-08-27 ENCOUNTER — APPOINTMENT (OUTPATIENT)
Dept: PLASTIC SURGERY | Facility: CLINIC | Age: 71
End: 2021-08-27
Payer: MEDICARE

## 2021-08-27 PROCEDURE — 99024 POSTOP FOLLOW-UP VISIT: CPT

## 2021-08-27 NOTE — ASSESSMENT
[FreeTextEntry1] : 70 yo F with substantial Moh's defect requiring soft tissue reconstruction.     \par Now POD#1 s/p FTSG (from right medial arm) to open nasal Mohs wound. \par \par - all adhesives removed\par - dressings changed, compressive arm wrap applied \par - patient was reassured\par - RUE rest and elevation\par - post-op instructions discussed and all questions answered\par - f/u next week for post-op as scheduled \par \par Due to COVID-19, pre-visit patient instructions were explained to the patient and their symptoms were checked upon arrival. Masks were used by the healthcare provider and staff and the examination room was cleaned after the patient visit concluded\par

## 2021-08-27 NOTE — HISTORY OF PRESENT ILLNESS
[FreeTextEntry1] : 70 yo LHD F with PMHx of MI (10 yrs ago), GERD, Barretts esophagus, Breast Ca s/p left lumpectomy, no RT, Colon Ca s/p partial colon resection, TOMER and prior h/o facial BCC who presents today for evaluation of new nasal Moh's defect. Pt reports a small nasal and right forehead lesion present for over 1 year with delay in treatment due to pandemic. Office biopsy by Dr. 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 Forehead SCC was excised 2-3 weeks ago by Dr. Narayan in the office and does not require further excision. Patient underwent 4-stage Moh's procedure yesterday with Dr. Narayan. \par She presents today to discuss reconstructive options for nasal Moh's defect. \par \par Occupation - retired  \par Former smoker, quit 30 years ago\par \par Denies any h/o DVTs or MRSA infections. \par No ASA tx\par \par Interval hx (8/27/21). Patient presents today POD#1 s/p nasal Moh's reconstruction with FTSG from the right upper arm c/o burning pain and discomfort and bleeding from the donor site. Denies any fever, chills or SOB. Taking oral antibiotics as instructed.

## 2021-08-27 NOTE — PHYSICAL EXAM
[de-identified] : well developed pleasant female, NAD [de-identified] : NC/AT; right lower forehead/brow transverse Mohs closure 4 cm\par well-healed left pre-auricular scar [de-identified] : unlabored  [de-identified] : BRIGIDR [de-identified] : Right medial arm incision with diffuse bruising and swelling and early superficial skin irritation 2/2 adhesive, no hematoma or active bleeding  [de-identified] : Nose - bolster dressing intact, no active bleeding, early skin irritation to adhesive, normal facial swelling as expected

## 2021-08-31 DIAGNOSIS — I25.2 OLD MYOCARDIAL INFARCTION: ICD-10-CM

## 2021-08-31 DIAGNOSIS — K21.9 GASTRO-ESOPHAGEAL REFLUX DISEASE WITHOUT ESOPHAGITIS: ICD-10-CM

## 2021-08-31 DIAGNOSIS — Z85.3 PERSONAL HISTORY OF MALIGNANT NEOPLASM OF BREAST: ICD-10-CM

## 2021-08-31 DIAGNOSIS — Z48.1 ENCOUNTER FOR PLANNED POSTPROCEDURAL WOUND CLOSURE: ICD-10-CM

## 2021-08-31 DIAGNOSIS — K22.70 BARRETT'S ESOPHAGUS WITHOUT DYSPLASIA: ICD-10-CM

## 2021-08-31 DIAGNOSIS — G47.33 OBSTRUCTIVE SLEEP APNEA (ADULT) (PEDIATRIC): ICD-10-CM

## 2021-08-31 DIAGNOSIS — R42 DIZZINESS AND GIDDINESS: ICD-10-CM

## 2021-08-31 DIAGNOSIS — I51.81 TAKOTSUBO SYNDROME: ICD-10-CM

## 2021-08-31 DIAGNOSIS — C44.1122 BASAL CELL CARCINOMA OF SKIN OF RIGHT LOWER EYELID, INCLUDING CANTHUS: ICD-10-CM

## 2021-08-31 DIAGNOSIS — Z85.038 PERSONAL HISTORY OF OTHER MALIGNANT NEOPLASM OF LARGE INTESTINE: ICD-10-CM

## 2021-09-01 ENCOUNTER — APPOINTMENT (OUTPATIENT)
Dept: PLASTIC SURGERY | Facility: CLINIC | Age: 71
End: 2021-09-01
Payer: MEDICARE

## 2021-09-01 PROCEDURE — 99024 POSTOP FOLLOW-UP VISIT: CPT

## 2021-09-01 NOTE — ASSESSMENT
[FreeTextEntry1] : 70 yo F with substantial Moh's defect requiring soft tissue reconstruction.     \par Now POD#6 s/p FTSG (from right medial arm) to open nasal Mohs wound. \par \par - bolster dressing removed\par - daily aquaphor + telfa to nose\par - RUE compressive wrap changed, keep clean and dry\par - RUE rest and elevation\par - post-op instructions discussed and all questions answered\par - f/u 1 week for RUE suture removal\par \par Due to COVID-19, pre-visit patient instructions were explained to the patient and their symptoms were checked upon arrival. Masks were used by the healthcare provider and staff and the examination room was cleaned after the patient visit concluded\par

## 2021-09-01 NOTE — PHYSICAL EXAM
[de-identified] : well developed pleasant female, NAD [de-identified] : NC/AT; right lower forehead/brow transverse Mohs closure 4 cm\par well-healed left pre-auricular scar [de-identified] : unlabored  [de-identified] : BRGIIDR [de-identified] : Right medial arm incision with diffuse bruising and swelling and early superficial skin irritation 2/2 adhesive, no hematoma or active bleeding  [de-identified] : Nose - skin graft with excellent take, swelling and bruising as expected, nontender, no s/s cellulitis

## 2021-09-01 NOTE — HISTORY OF PRESENT ILLNESS
[FreeTextEntry1] : 72 yo LHD F with PMHx of MI (10 yrs ago), GERD, Barretts esophagus, Breast Ca s/p left lumpectomy, no RT, Colon Ca s/p partial colon resection, TOMER and prior h/o facial BCC who presents today for evaluation of new nasal Moh's defect. Pt reports a small nasal and right forehead lesion present for over 1 year with delay in treatment due to pandemic. Office biopsy by Dr. 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 Forehead SCC was excised 2-3 weeks ago by Dr. Narayan in the office and does not require further excision. Patient underwent 4-stage Moh's procedure yesterday with Dr. Narayan. \par She presents today to discuss reconstructive options for nasal Moh's defect. \par \par Occupation - retired  \par Former smoker, quit 30 years ago\par \par Denies any h/o DVTs or MRSA infections. \par No ASA tx\par \par Interval hx (8/27/21). Patient presents today POD#1 s/p nasal Moh's reconstruction with FTSG from the right upper arm c/o burning pain and discomfort and bleeding from the donor site. Denies any fever, chills or SOB. Taking oral antibiotics as instructed. \par \par Interval hx (9/1/21): Patient presents today POD#6 s/p nasal Moh's reconstruction with FTSG from the right upper arm. States she feels better since last visit but concerned with suture pulling on left upper eyelid skin. Denies significnat pain, f/c, or drainage.

## 2021-09-09 ENCOUNTER — APPOINTMENT (OUTPATIENT)
Dept: PLASTIC SURGERY | Facility: CLINIC | Age: 71
End: 2021-09-09
Payer: MEDICARE

## 2021-09-09 DIAGNOSIS — C44.311 BASAL CELL CARCINOMA OF SKIN OF NOSE: ICD-10-CM

## 2021-09-09 PROCEDURE — 99024 POSTOP FOLLOW-UP VISIT: CPT

## 2021-09-09 NOTE — PHYSICAL EXAM
[de-identified] : well developed pleasant female, NAD [de-identified] : NC/AT; right lower forehead/brow transverse Mohs closure 4 cm\par well-healed left pre-auricular scar [de-identified] : unlabored  [de-identified] : BRIGIDR [de-identified] : Right medial arm incision with swelling with erythema, no hematoma or active bleeding  [de-identified] : Nose - skin graft adherent and well incorporated with excellent take, swelling and bruising R>L resolving , nontender, no s/s cellulitis

## 2021-09-09 NOTE — ASSESSMENT
[FreeTextEntry1] : 72 yo F with substantial Moh's defect requiring soft tissue reconstruction.     \par Now POD#14 s/p FTSG (from right medial arm) to open nasal Mohs wound. Doing well. \par \par - Sutures removed\par - Daily Aquaphor to skin graft and donor site \par - Rx Doxycycline for RUE erythema \par - RUE rest and elevation\par - May shower and get graft wet\par - Start gentle massage in 1-2 weeks when no longer tender \par - Post-op instructions discussed and all questions answered\par - F/u 1 month with Dr. Tomas\par \par Due to COVID-19, pre-visit patient instructions were explained to the patient and their symptoms were checked upon arrival. Masks were used by the healthcare provider and staff and the examination room was cleaned after the patient visit concluded\par

## 2021-09-09 NOTE — HISTORY OF PRESENT ILLNESS
[FreeTextEntry1] : 72 yo LHD F with PMHx of MI (10 yrs ago), GERD, Barretts esophagus, Breast Ca s/p left lumpectomy, no RT, Colon Ca s/p partial colon resection, TOMER and prior h/o facial BCC who presents today for evaluation of new nasal Moh's defect. Pt reports a small nasal and right forehead lesion present for over 1 year with delay in treatment due to pandemic. Office biopsy by Dr. 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 Forehead SCC was excised 2-3 weeks ago by Dr. Narayan in the office and does not require further excision. Patient underwent 4-stage Moh's procedure yesterday with Dr. Narayan. \par She presents today to discuss reconstructive options for nasal Moh's defect. \par \par Occupation - retired  \par Former smoker, quit 30 years ago\par \par Denies any h/o DVTs or MRSA infections. \par No ASA tx\par \par Interval hx (8/27/21). Patient presents today POD#1 s/p nasal Moh's reconstruction with FTSG from the right upper arm c/o burning pain and discomfort and bleeding from the donor site. Denies any fever, chills or SOB. Taking oral antibiotics as instructed. \par \par Interval hx (9/1/21): Patient presents today POD#6 s/p nasal Moh's reconstruction with FTSG from the right upper arm. States she feels better since last visit but concerned with suture pulling on left upper eyelid skin. Denies significant pain, f/c, or drainage.\par \par Interval hx (9/9/21): Patient presents today POD#14 s/p nasal Moh's reconstruction with FTSG from the right upper arm c/o right arm discomfort and swelling. Applying daily Bacitracin. Denies any f/c or drainage.

## 2021-09-27 ENCOUNTER — APPOINTMENT (OUTPATIENT)
Dept: CARDIOLOGY | Facility: CLINIC | Age: 71
End: 2021-09-27
Payer: MEDICARE

## 2021-09-27 PROCEDURE — 93306 TTE W/DOPPLER COMPLETE: CPT

## 2021-09-29 ENCOUNTER — OUTPATIENT (OUTPATIENT)
Dept: OUTPATIENT SERVICES | Facility: HOSPITAL | Age: 71
LOS: 1 days | Discharge: HOME | End: 2021-09-29

## 2021-09-29 ENCOUNTER — APPOINTMENT (OUTPATIENT)
Age: 71
End: 2021-09-29

## 2021-09-29 VITALS
HEIGHT: 67 IN | DIASTOLIC BLOOD PRESSURE: 80 MMHG | HEART RATE: 70 BPM | WEIGHT: 198 LBS | BODY MASS INDEX: 31.08 KG/M2 | SYSTOLIC BLOOD PRESSURE: 138 MMHG

## 2021-09-29 DIAGNOSIS — Z98.890 OTHER SPECIFIED POSTPROCEDURAL STATES: Chronic | ICD-10-CM

## 2021-09-29 DIAGNOSIS — E04.2 NONTOXIC MULTINODULAR GOITER: ICD-10-CM

## 2021-09-29 NOTE — ASSESSMENT
[FreeTextEntry1] : 71 year old female with a history of skin cancer, colon polyps, breast cancer presents for initial eval for enlarged thyroid with possible nodules found incidentally on imaging. \par \par #Enlarged thyroid\par - CT neck in ED revealed enlarged thyroid with multnodular goiter\par - f/u TFTs\par - f/u U/S thyroid\par - f/u 1 month

## 2021-09-29 NOTE — REVIEW OF SYSTEMS
[Fatigue] : no fatigue [Shortness Of Breath] : no shortness of breath [Nausea] : no nausea [Constipation] : no constipation [Abdominal Pain] : no abdominal pain [Vomiting] : no vomiting [Diarrhea] : no diarrhea

## 2021-09-29 NOTE — PHYSICAL EXAM
[Alert] : alert [Well Nourished] : well nourished [No Respiratory Distress] : no respiratory distress [Normal S1, S2] : normal S1 and S2 [Normal Rate] : heart rate was normal [No Edema] : no peripheral edema [de-identified] : Healing skin graft on nose  [de-identified] : Enlarged thyroid , nodular

## 2021-09-29 NOTE — END OF VISIT
[FreeTextEntry3] : incidental finding of MNG on CT , no hyperthyroid symptoms, no compressive symptoms , no FH of thyroid cancer but personal history of breast cancer and skin cancer , no radiation to the neck in the past \par - check tft's , and thyroid US based on result will advise further  [] : Resident [Time Spent: ___ minutes] : I have spent [unfilled] minutes of time on the encounter. [>50% of the face to face encounter time was spent on counseling and/or coordination of care for ___] : Greater than 50% of the face to face encounter time was spent on counseling and/or coordination of care for [unfilled]

## 2021-09-29 NOTE — HISTORY OF PRESENT ILLNESS
[FreeTextEntry1] : 71 year old female with a history of skin cancer, colon polyps, breast cancer presents for initial eval.\par  Went for a derm procedure in June, then a few hours later at home while talking at a table she felt the room spinning then felt her body lean to the side. It happened several times then resolved. The next morning she went to the ED to  if she had a stroke. ED did MRI, no stroke and told her to rule out thyroid issue. CT neck revealed enlarged thyroid with possible nodules.  Denies fatigue, heat/ cold intolerance, changes in bowel movement, weight changes, brittling of the nails.

## 2021-09-30 LAB
T3FREE SERPL-MCNC: 3.47 PG/ML
T4 FREE SERPL-MCNC: 1.2 NG/DL
T4 SERPL-MCNC: 8.4 UG/DL
TSH SERPL-ACNC: 1.26 UIU/ML

## 2021-10-11 ENCOUNTER — APPOINTMENT (OUTPATIENT)
Dept: CARDIOLOGY | Facility: CLINIC | Age: 71
End: 2021-10-11

## 2021-10-15 ENCOUNTER — APPOINTMENT (OUTPATIENT)
Dept: PLASTIC SURGERY | Facility: CLINIC | Age: 71
End: 2021-10-15
Payer: MEDICARE

## 2021-10-15 PROCEDURE — 99024 POSTOP FOLLOW-UP VISIT: CPT

## 2021-10-15 NOTE — ASSESSMENT
[FreeTextEntry1] : 72 yo F with substantial Moh's defect requiring soft tissue reconstruction.     \par 7 weeks s/p FTSG (from right medial arm) to open nasal Mohs wound. Doing well. \par \par - Daily Aquaphor to skin graft and donor site \par - RUE rest and elevation\par - c/w massage in 1-2 weeks\par - reassurance given\par - Post-op instructions discussed and all questions answered\par - F/u 6-8 weeks\par \par Due to COVID-19, pre-visit patient instructions were explained to the patient and their symptoms were checked upon arrival. Masks were used by the healthcare provider and staff and the examination room was cleaned after the patient visit concluded\par

## 2021-10-15 NOTE — HISTORY OF PRESENT ILLNESS
[FreeTextEntry1] : 72 yo LHD F with PMHx of MI (10 yrs ago), GERD, Barretts esophagus, Breast Ca s/p left lumpectomy, no RT, Colon Ca s/p partial colon resection, TOMER and prior h/o facial BCC who presents today for evaluation of new nasal Moh's defect. Pt reports a small nasal and right forehead lesion present for over 1 year with delay in treatment due to pandemic. Office biopsy by Dr. 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 Forehead SCC was excised 2-3 weeks ago by Dr. Narayan in the office and does not require further excision. Patient underwent 4-stage Moh's procedure yesterday with Dr. Narayan. \par She presents today to discuss reconstructive options for nasal Moh's defect. \par \par Occupation - retired  \par Former smoker, quit 30 years ago\par \par Denies any h/o DVTs or MRSA infections. \par No ASA tx\par \par Interval hx (8/27/21). Patient presents today POD#1 s/p nasal Moh's reconstruction with FTSG from the right upper arm c/o burning pain and discomfort and bleeding from the donor site. Denies any fever, chills or SOB. Taking oral antibiotics as instructed. \par \par Interval hx (9/1/21): Patient presents today POD#6 s/p nasal Moh's reconstruction with FTSG from the right upper arm. States she feels better since last visit but concerned with suture pulling on left upper eyelid skin. Denies significant pain, f/c, or drainage.\par \par Interval hx (9/9/21): Patient presents today POD#14 s/p nasal Moh's reconstruction with FTSG from the right upper arm c/o right arm discomfort and swelling. Applying daily Bacitracin. Denies any f/c or drainage. \par \par Interval hx (10/15/21): 7 weeks s/p nasal Moh's reconstruction with FTSG from the right upper arm c/o right arm discomfort and swelling.  c/o " at right nasal distal FTSG edge.

## 2021-10-15 NOTE — PHYSICAL EXAM
[de-identified] : well developed pleasant female, NAD [de-identified] : NC/AT; right lower forehead/brow transverse Mohs closure 4 cm\par well-healed left pre-auricular scar [de-identified] : unlabored  [de-identified] : BRIGIDR [de-identified] : Right medial arm incision with swelling with erythema, no hematoma or active bleeding  [de-identified] : Nose - skin graft adherent and well incorporated with excellent take, swelling and bruising R>L resolving , nontender, no s/s cellulitis

## 2021-10-18 ENCOUNTER — APPOINTMENT (OUTPATIENT)
Dept: NEUROLOGY | Facility: CLINIC | Age: 71
End: 2021-10-18
Payer: MEDICARE

## 2021-10-18 VITALS
WEIGHT: 198 LBS | HEIGHT: 67 IN | BODY MASS INDEX: 31.08 KG/M2 | HEART RATE: 65 BPM | SYSTOLIC BLOOD PRESSURE: 123 MMHG | OXYGEN SATURATION: 98 % | DIASTOLIC BLOOD PRESSURE: 69 MMHG | TEMPERATURE: 96.6 F

## 2021-10-18 DIAGNOSIS — R42 DIZZINESS AND GIDDINESS: ICD-10-CM

## 2021-10-18 PROCEDURE — 99213 OFFICE O/P EST LOW 20 MIN: CPT

## 2021-10-18 NOTE — HISTORY OF PRESENT ILLNESS
[FreeTextEntry1] : Patient is 71 woman with a PMHx of CAD, remote hx of colon cancer, current basal cell carcinoma, hx of Takotsubo and MI who presents to clinic for follow up of vertigo during ED visit on 6/5/2021. Patient was sitting on the couch with a friend talking when she felt sudden onset of vertigo, as if she was being pulled to the left. Pt had some associated nausea. She sat down for a while and then the feeling self-resolved. She was seen by Dr. Argueta in the hospital, that thought to have peripheral vertigo. MRI brain was negative for pathology. CTA H/N was negative for major stenoses or occlusion. She was found to have multinodular goiter. DDx consists of seizure vs tia vs vestibular issues vs hypothyroidism. She also admits to anxiety. She was last seen in clinic on 6/24/21. At that time, patient declined vestibular therapy as all symptoms have resolved. Exam was unremarkable, FTN with eyes closed negative for deviation, Romberg negative, heel-toe walking intact, no imbalance when pushed.  \par \par Today, she presents with no neurological complaints and no more episodes of vertigo. Thyroid panel was normal. She has followed with endocrinologist. REEG negative. Last LDL 94, A1c 5.4 from 2/2020. She is pending repeat BW soon. \par \par

## 2021-10-18 NOTE — PHYSICAL EXAM
[FreeTextEntry1] : Focal neurological exam:\par \par MS: Awake, alert, oriented to person, place, situation and time. Normal affect. Follows commands. \par \par Language: Speech is clear, fluent with good repetition & comprehension. No dysarthria. \par \par CNs 2 - 12 intact. EOMI no nystagmus, no diplopia. VFF. No facial asymmetry b/l, full eye closure strength b/l. Hearing grossly normal. Head turning & shoulder shrug intact b/l. Tongue midline, normal movements, no atrophy.\par \par Motor: Normal muscle bulk & tone. No noticeable tremor or seizure. No pronator drift. Muscle strength of b/l UE and b/l LE 5/5. \par \par Reflexes: DTR of biceps 1+, knees 1+  \par \par Sensation: Intact to LT, temperature and vibration b/l throughout\par \par Cortical: No extinction\par \par Coordination: No dysmetria to FTN.\par \par Gait: No postural instability. Normal stance and tandem gait.\par \par \par \par \par

## 2021-10-18 NOTE — DISCUSSION/SUMMARY
[FreeTextEntry1] : Patient is 71 woman with a PMHx of CAD, remote hx of colon cancer, current basal cell carcinoma, hx of Takotsubo and MI who presents to clinic for follow up of vertigo during ED visit on 6/5/2021. Patient was sitting on the couch with a friend talking when she felt sudden onset of vertigo, as if she was being pulled to the left with associated nausea. After sitting down sx resolved. MRI brain was negative for pathology. CTA H/N was negative for major stenoses or occlusion. She was found to have multinodular goiter. DDx consisted of seizure vs tia vs vestibular issues vs hypothyroidism. She was last seen in clinic on 6/24/21. At that time, patient declined vestibular therapy as all symptoms have resolved. Today, she presents with no neurological complaints and no more episodes of vertigo. Thyroid panel was normal. She has followed with endocrinologist. REEG negative. Last LDL 94, A1c 5.4 from 2/2020. She is pending repeat BW soon. Exam today is benign. \par \par PLAN:  \par -Obtain Lipid panel and A1c \par -Follow up in 1 year \par \par

## 2021-10-18 NOTE — REASON FOR VISIT
[Follow-Up: _____] : a [unfilled] follow-up visit [FreeTextEntry1] : presents to clinic for follow up of vertigo during ED visit on 6/5/2021

## 2021-10-20 ENCOUNTER — APPOINTMENT (OUTPATIENT)
Dept: ENDOCRINOLOGY | Facility: CLINIC | Age: 71
End: 2021-10-20

## 2021-11-29 ENCOUNTER — APPOINTMENT (OUTPATIENT)
Dept: PLASTIC SURGERY | Facility: CLINIC | Age: 71
End: 2021-11-29
Payer: MEDICARE

## 2021-11-29 DIAGNOSIS — M95.0 ACQUIRED DEFORMITY OF NOSE: ICD-10-CM

## 2021-11-29 DIAGNOSIS — Z98.890 ACQUIRED DEFORMITY OF NOSE: ICD-10-CM

## 2021-11-29 PROCEDURE — 99212 OFFICE O/P EST SF 10 MIN: CPT

## 2021-11-29 NOTE — PHYSICAL EXAM
[de-identified] : well developed pleasant female, NAD [de-identified] : NC/AT; right lower forehead/brow transverse Mohs closure 4 cm\par well-healed left pre-auricular scar [de-identified] : unlabored  [de-identified] : BRIGIDR [de-identified] : Right medial arm incision healed, c/d/i, excellent cosmesis [de-identified] : Nose - skin graft mature with excellent take, good overall contour with slight  hypopigmentation right medial canthal area, minor contour depression at supratip break no

## 2021-11-29 NOTE — ASSESSMENT
[FreeTextEntry1] : 70 yo F with substantial Moh's defect requiring soft tissue reconstruction.     \par Now 13 weeks s/p FTSG (from right medial arm) to open nasal Mohs wound. Doing well. \par Excellent contour and aesthetic unit camouflage\par \par - Daily Aquaphor to skin graft and donor site \par - c/w massage \par - dermatologic surveillance discussed (next 3/2022)\par - may start make-up for camouflage\par - pt most concerned about supratip break contour depression, appearance amplified by light/contrast.  reviewed AFG, its B/R/A.\par - reassurance given; excellent reconstructive result\par - F/u 3 months to re-evaluation FTSG\par \par Photos were taken\par \par Due to COVID-19, pre-visit patient instructions were explained to the patient and their symptoms were checked upon arrival. Masks were used by the healthcare provider and staff and the examination room was cleaned after the patient visit concluded\par

## 2021-11-29 NOTE — HISTORY OF PRESENT ILLNESS
[FreeTextEntry1] : 72 yo LHD F with PMHx of MI (10 yrs ago), GERD, Barretts esophagus, Breast Ca s/p left lumpectomy, no RT, Colon Ca s/p partial colon resection, TOMER and prior h/o facial BCC who presents today for evaluation of new nasal Moh's defect. Pt reports a small nasal and right forehead lesion present for over 1 year with delay in treatment due to pandemic. Office biopsy by Dr. 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 Forehead SCC was excised 2-3 weeks ago by Dr. Naraayn in the office and does not require further excision. Patient underwent 4-stage Moh's procedure yesterday with Dr. Narayan. \par She presents today to discuss reconstructive options for nasal Moh's defect. \par \par Occupation - retired  \par Former smoker, quit 30 years ago\par \par Denies any h/o DVTs or MRSA infections. \par No ASA tx\par \par Interval hx (8/27/21). Patient presents today POD#1 s/p nasal Moh's reconstruction with FTSG from the right upper arm c/o burning pain and discomfort and bleeding from the donor site. Denies any fever, chills or SOB. Taking oral antibiotics as instructed. \par \par Interval hx (9/1/21): Patient presents today POD#6 s/p nasal Moh's reconstruction with FTSG from the right upper arm. States she feels better since last visit but concerned with suture pulling on left upper eyelid skin. Denies significant pain, f/c, or drainage.\par \par Interval hx (9/9/21): Patient presents today POD#14 s/p nasal Moh's reconstruction with FTSG from the right upper arm c/o right arm discomfort and swelling. Applying daily Bacitracin. Denies any f/c or drainage. \par \par Interval hx (10/15/21): 7 weeks s/p nasal Moh's reconstruction with FTSG from the right upper arm c/o right arm discomfort and swelling.  c/o " at right nasal distal FTSG edge.\par \par Interval hx (11/29/21): Pt presents today 13 weeks s/p nasal Moh's reconstruction with FTSG from the right upper arm. Doing well overall. Applying daily Aquaphor.

## 2021-12-14 ENCOUNTER — APPOINTMENT (OUTPATIENT)
Dept: CARDIOLOGY | Facility: CLINIC | Age: 71
End: 2021-12-14

## 2022-01-18 ENCOUNTER — APPOINTMENT (OUTPATIENT)
Dept: CARDIOLOGY | Facility: CLINIC | Age: 72
End: 2022-01-18
Payer: MEDICARE

## 2022-01-18 VITALS — BODY MASS INDEX: 31.39 KG/M2 | HEIGHT: 67 IN | WEIGHT: 200 LBS

## 2022-01-18 VITALS — DIASTOLIC BLOOD PRESSURE: 70 MMHG | HEART RATE: 64 BPM | SYSTOLIC BLOOD PRESSURE: 120 MMHG | RESPIRATION RATE: 18 BRPM

## 2022-01-18 VITALS — HEART RATE: 64 BPM | DIASTOLIC BLOOD PRESSURE: 70 MMHG | RESPIRATION RATE: 18 BRPM | SYSTOLIC BLOOD PRESSURE: 120 MMHG

## 2022-01-18 DIAGNOSIS — R00.1 BRADYCARDIA, UNSPECIFIED: ICD-10-CM

## 2022-01-18 PROBLEM — E04.9 ENLARGED THYROID: Status: ACTIVE | Noted: 2021-09-29

## 2022-01-18 PROCEDURE — 93000 ELECTROCARDIOGRAM COMPLETE: CPT

## 2022-01-18 PROCEDURE — 99214 OFFICE O/P EST MOD 30 MIN: CPT

## 2022-02-28 ENCOUNTER — APPOINTMENT (OUTPATIENT)
Dept: PLASTIC SURGERY | Facility: CLINIC | Age: 72
End: 2022-02-28

## 2022-04-28 ENCOUNTER — OUTPATIENT (OUTPATIENT)
Dept: OUTPATIENT SERVICES | Facility: HOSPITAL | Age: 72
LOS: 1 days | Discharge: HOME | End: 2022-04-28
Payer: MEDICARE

## 2022-04-28 DIAGNOSIS — Z98.890 OTHER SPECIFIED POSTPROCEDURAL STATES: Chronic | ICD-10-CM

## 2022-04-28 DIAGNOSIS — E04.9 NONTOXIC GOITER, UNSPECIFIED: ICD-10-CM

## 2022-04-28 PROCEDURE — 76536 US EXAM OF HEAD AND NECK: CPT | Mod: 26

## 2022-07-08 ENCOUNTER — APPOINTMENT (OUTPATIENT)
Dept: CARDIOLOGY | Facility: CLINIC | Age: 72
End: 2022-07-08

## 2022-07-08 VITALS — DIASTOLIC BLOOD PRESSURE: 80 MMHG | HEART RATE: 68 BPM | SYSTOLIC BLOOD PRESSURE: 110 MMHG | RESPIRATION RATE: 18 BRPM

## 2022-07-08 VITALS — WEIGHT: 197 LBS | BODY MASS INDEX: 30.92 KG/M2 | HEIGHT: 67 IN

## 2022-07-08 PROCEDURE — 93000 ELECTROCARDIOGRAM COMPLETE: CPT

## 2022-07-08 PROCEDURE — 99214 OFFICE O/P EST MOD 30 MIN: CPT

## 2022-07-15 ENCOUNTER — APPOINTMENT (OUTPATIENT)
Age: 72
End: 2022-07-15

## 2022-07-15 VITALS
SYSTOLIC BLOOD PRESSURE: 120 MMHG | DIASTOLIC BLOOD PRESSURE: 70 MMHG | OXYGEN SATURATION: 98 % | HEIGHT: 67 IN | WEIGHT: 197 LBS | HEART RATE: 80 BPM | RESPIRATION RATE: 14 BRPM | BODY MASS INDEX: 30.92 KG/M2

## 2022-07-15 PROCEDURE — 99214 OFFICE O/P EST MOD 30 MIN: CPT

## 2022-07-15 NOTE — DISCUSSION/SUMMARY
[FreeTextEntry1] : HIGHLY SEVERE TOMER DECLINED IN LAB SLEEP STUDY HST SOB ON EXERTION PFT CARDIO REVIEWED WEIGHT LOSS

## 2022-07-15 NOTE — HISTORY OF PRESENT ILLNESS
[Initial Evaluation] : an initial evaluation of [Excessive Sleepiness-Day] : excessive daytime sleepiness [Witnessed Apnea] : witnessed apnea during sleep [Unrefreshing Sleep] : unrefreshing sleep [Sleepy When Sedentary] : sleepy when sedentary [Currently Experiencing] : The patient is currently experiencing symptoms. [Gradual] : gradual [Moderate] : moderate in severity [Worsening] : worsening [Sleeping on Back] : sleeping on back [Fatigue] : fatigue [Weight Gain] : weight gain [Shortness of Breath] : shortness of breath [TextBox_4] : REPORT TYPICAL SYMPTOMS OF TOMER\par INTERMITTENT SOB ON EXERTION

## 2022-08-03 ENCOUNTER — OUTPATIENT (OUTPATIENT)
Dept: OUTPATIENT SERVICES | Facility: HOSPITAL | Age: 72
LOS: 1 days | Discharge: HOME | End: 2022-08-03

## 2022-08-03 ENCOUNTER — APPOINTMENT (OUTPATIENT)
Dept: SLEEP CENTER | Facility: HOSPITAL | Age: 72
End: 2022-08-03

## 2022-08-03 DIAGNOSIS — Z98.890 OTHER SPECIFIED POSTPROCEDURAL STATES: Chronic | ICD-10-CM

## 2022-08-03 PROCEDURE — 95800 SLP STDY UNATTENDED: CPT | Mod: 26

## 2022-08-04 DIAGNOSIS — G47.33 OBSTRUCTIVE SLEEP APNEA (ADULT) (PEDIATRIC): ICD-10-CM

## 2022-08-15 NOTE — PRE-ANESTHESIA EVALUATION ADULT - NSANTHTOBACCOSD_GEN_ALL_CORE
Please contact the number below for your appointment.     Radiology (see reference sheet) or call [phone]    If unable to schedule with above listed number please contact referral department at [phone]    
No

## 2022-08-31 ENCOUNTER — APPOINTMENT (OUTPATIENT)
Age: 72
End: 2022-08-31

## 2022-08-31 VITALS
OXYGEN SATURATION: 97 % | HEIGHT: 67 IN | BODY MASS INDEX: 31.23 KG/M2 | SYSTOLIC BLOOD PRESSURE: 122 MMHG | RESPIRATION RATE: 14 BRPM | DIASTOLIC BLOOD PRESSURE: 84 MMHG | HEART RATE: 94 BPM | WEIGHT: 199 LBS

## 2022-08-31 DIAGNOSIS — G47.33 OBSTRUCTIVE SLEEP APNEA (ADULT) (PEDIATRIC): ICD-10-CM

## 2022-08-31 DIAGNOSIS — G47.19 OTHER HYPERSOMNIA: ICD-10-CM

## 2022-08-31 PROCEDURE — 99213 OFFICE O/P EST LOW 20 MIN: CPT

## 2022-09-20 NOTE — ASU PREOP CHECKLIST - ISOLATION PRECAUTIONS
JG    Caller: Vicky Nino    Topic/issue: Patient was returning our call about her appointment that she has coming up and asked for a call back to discuss it  Callback Number: [phone] (home) [phone] (work)      Thank you      -Migel HERCULES    
Upon chart review, unable to locate any documentation regarding initial call placed by this office.    [employer] message sent requesting more information regarding concerns, awaiting pt response.    
none

## 2022-12-15 ENCOUNTER — APPOINTMENT (OUTPATIENT)
Dept: GASTROENTEROLOGY | Facility: CLINIC | Age: 72
End: 2022-12-15

## 2023-01-06 ENCOUNTER — APPOINTMENT (OUTPATIENT)
Dept: CARDIOLOGY | Facility: CLINIC | Age: 73
End: 2023-01-06
Payer: MEDICARE

## 2023-01-06 VITALS — RESPIRATION RATE: 18 BRPM | SYSTOLIC BLOOD PRESSURE: 128 MMHG | DIASTOLIC BLOOD PRESSURE: 80 MMHG

## 2023-01-06 VITALS — HEIGHT: 67 IN | WEIGHT: 195 LBS | BODY MASS INDEX: 30.61 KG/M2 | HEART RATE: 69 BPM

## 2023-01-06 DIAGNOSIS — I25.2 OLD MYOCARDIAL INFARCTION: ICD-10-CM

## 2023-01-06 DIAGNOSIS — I34.0 NONRHEUMATIC MITRAL (VALVE) INSUFFICIENCY: ICD-10-CM

## 2023-01-06 DIAGNOSIS — R06.02 SHORTNESS OF BREATH: ICD-10-CM

## 2023-01-06 PROCEDURE — 93000 ELECTROCARDIOGRAM COMPLETE: CPT

## 2023-01-06 PROCEDURE — 99214 OFFICE O/P EST MOD 30 MIN: CPT

## 2023-03-01 ENCOUNTER — APPOINTMENT (OUTPATIENT)
Age: 73
End: 2023-03-01

## 2023-04-20 NOTE — ASU DISCHARGE PLAN (ADULT/PEDIATRIC) - FREQUENT HAND WASHING PREVENTS THE SPREAD OF INFECTION.
Was The Medication Purchased By The Clinic?: No Ndc (45mg Syringe): [address]-86 Stelara Amount: 45 mg Render If Medication Purchased By Clinic In Visit Note?: Yes Statement Selected Consent: The risks of pain and injection site reactions were reviewed with the patient prior to the injection. Administered By (Optional): Dr. Cruz Lot # (Optional): [id number] Expiration Date (Optional): 05/2025 Detail Level: None Ndc (90mg Syringe): [address]-07 J-Code:

## 2024-01-15 NOTE — PATIENT PROFILE ADULT. - SELF-CARE: ACTIVITY TOLERANCE, USUAL, PROFILE
BMT ONC Adult Bone Marrow Biopsy Procedure Note  January 16, 2024  BP (!) 144/103 (BP Location: Right arm, Patient Position: Sitting, Cuff Size: Adult Regular)   Pulse 83   Temp 98.2  F (36.8  C) (Oral)   Resp 16   Wt 56.4 kg (124 lb 6.4 oz)   SpO2 100%   BMI 20.35 kg/m       Learning needs assessment complete within 12 months? YES    DIAGNOSIS: MSD    PROCEDURE: Unilateral Bone Marrow Biopsy and Unilateral Aspirate    [place] [facility] [place]    Patient s identification was positively verified by verbal identification and invasive procedure safety checklist was completed. Informed consent was obtained. Following the administration of 1.5 mg Midazolam as pre-medication, patient was placed in the prone position and prepped and draped in a sterile manner. Approximately 5 cc of 1% Lidocaine was used over the left posterior iliac spine. Following this a 3 mm incision was made. Trephine bone marrow core(s) was (were) obtained from the [facility]. Bone marrow aspirates were obtained from the [facility]. Aspirates were sent for morphology, immunophenotyping, cytogenetics, molecular diagnostics gene rearrangement, and research studies. A total of approximately 40 ml of marrow was aspirated. Following this procedure a sterile dressing was applied to the bone marrow biopsy site(s). The patient was placed in the supine position to maintain pressure on the biopsy site. Post-procedure wound care instructions were given.     Complications: NO    Pre-procedural pain: 0 out of 10 on the numeric pain rating scale.     Procedural pain: 7 out of 10 on the numeric pain rating scale.     Post-procedural pain assessment: 0 out of 10 on the numeric pain rating scale.     Interventions: NO    Length of procedure:21 minutes to 45 minutes    Procedure performed by: YOLANDE Herrera     good

## 2024-01-28 ENCOUNTER — TRANSCRIPTION ENCOUNTER (OUTPATIENT)
Age: 74
End: 2024-01-28

## 2024-10-30 NOTE — ASU PATIENT PROFILE, ADULT - ANESTHESIA, PREVIOUS REACTION, PROFILE
Problem: Adult Inpatient Plan of Care  Goal: Absence of Hospital-Acquired Illness or Injury  Intervention: Identify and Manage Fall Risk  Recent Flowsheet Documentation  Taken 10/30/2024 0806 by Yenni Martínez RN  Safety Promotion/Fall Prevention: safety round/check completed  Intervention: Prevent Skin Injury  Recent Flowsheet Documentation  Taken 10/30/2024 0806 by Yenni Martínez RN  Body Position: position changed independently  Intervention: Prevent Infection  Recent Flowsheet Documentation  Taken 10/30/2024 0806 by Yenni Martínez RN  Infection Prevention:   hand hygiene promoted   rest/sleep promoted   single patient room provided   Goal Outcome Evaluation:    Pt A&Ox4, npo for possible surgery, awaiting vascular surgery. CT completed this AM. R foot low perfusion, pulses barley palpable. Black toes present. Transfers with pivot ast x1. Continues on IV ABX. Mepilex in place to RLE. Podiatry assessed patient. Call light within reach.   none